# Patient Record
Sex: FEMALE | Race: WHITE | NOT HISPANIC OR LATINO | Employment: FULL TIME | URBAN - METROPOLITAN AREA
[De-identification: names, ages, dates, MRNs, and addresses within clinical notes are randomized per-mention and may not be internally consistent; named-entity substitution may affect disease eponyms.]

---

## 2017-04-25 ENCOUNTER — ANESTHESIA (OUTPATIENT)
Dept: SURGERY | Facility: CLINIC | Age: 37
End: 2017-04-25
Payer: COMMERCIAL

## 2017-04-25 ENCOUNTER — HOSPITAL ENCOUNTER (OUTPATIENT)
Facility: CLINIC | Age: 37
Discharge: HOME | End: 2017-04-25
Attending: OBSTETRICS & GYNECOLOGY | Admitting: OBSTETRICS & GYNECOLOGY
Payer: COMMERCIAL

## 2017-04-25 ENCOUNTER — SURGERY (OUTPATIENT)
Age: 37
End: 2017-04-25

## 2017-04-25 ENCOUNTER — ANESTHESIA EVENT (OUTPATIENT)
Dept: SURGERY | Facility: CLINIC | Age: 37
End: 2017-04-25
Payer: COMMERCIAL

## 2017-04-25 VITALS
HEIGHT: 64 IN | WEIGHT: 291 LBS | BODY MASS INDEX: 49.68 KG/M2 | TEMPERATURE: 98.1 F | OXYGEN SATURATION: 95 % | DIASTOLIC BLOOD PRESSURE: 82 MMHG | SYSTOLIC BLOOD PRESSURE: 137 MMHG | RESPIRATION RATE: 18 BRPM

## 2017-04-25 DIAGNOSIS — Z98.890 STATUS POST HYSTEROSCOPY: Primary | ICD-10-CM

## 2017-04-25 LAB
HCG SERPL QL: NEGATIVE
INTERPRETATION ECG - MUSE: NORMAL

## 2017-04-25 PROCEDURE — 36000056 ZZH SURGERY LEVEL 3 1ST 30 MIN: Performed by: OBSTETRICS & GYNECOLOGY

## 2017-04-25 PROCEDURE — 71000027 ZZH RECOVERY PHASE 2 EACH 15 MINS: Performed by: OBSTETRICS & GYNECOLOGY

## 2017-04-25 PROCEDURE — 71000012 ZZH RECOVERY PHASE 1 LEVEL 1 FIRST HR: Performed by: OBSTETRICS & GYNECOLOGY

## 2017-04-25 PROCEDURE — 36415 COLL VENOUS BLD VENIPUNCTURE: CPT | Performed by: ANESTHESIOLOGY

## 2017-04-25 PROCEDURE — 84703 CHORIONIC GONADOTROPIN ASSAY: CPT | Performed by: ANESTHESIOLOGY

## 2017-04-25 PROCEDURE — 27210794 ZZH OR GENERAL SUPPLY STERILE: Performed by: OBSTETRICS & GYNECOLOGY

## 2017-04-25 PROCEDURE — 25000128 H RX IP 250 OP 636: Performed by: NURSE ANESTHETIST, CERTIFIED REGISTERED

## 2017-04-25 PROCEDURE — 37000009 ZZH ANESTHESIA TECHNICAL FEE, EACH ADDTL 15 MIN: Performed by: OBSTETRICS & GYNECOLOGY

## 2017-04-25 PROCEDURE — 88305 TISSUE EXAM BY PATHOLOGIST: CPT | Performed by: OBSTETRICS & GYNECOLOGY

## 2017-04-25 PROCEDURE — 40000306 ZZH STATISTIC PRE PROC ASSESS II: Performed by: OBSTETRICS & GYNECOLOGY

## 2017-04-25 PROCEDURE — 25000125 ZZHC RX 250: Performed by: NURSE ANESTHETIST, CERTIFIED REGISTERED

## 2017-04-25 PROCEDURE — 25800025 ZZH RX 258: Performed by: ANESTHESIOLOGY

## 2017-04-25 PROCEDURE — 25000566 ZZH SEVOFLURANE, EA 15 MIN: Performed by: OBSTETRICS & GYNECOLOGY

## 2017-04-25 PROCEDURE — 88305 TISSUE EXAM BY PATHOLOGIST: CPT | Mod: 26 | Performed by: OBSTETRICS & GYNECOLOGY

## 2017-04-25 PROCEDURE — 36000058 ZZH SURGERY LEVEL 3 EA 15 ADDTL MIN: Performed by: OBSTETRICS & GYNECOLOGY

## 2017-04-25 PROCEDURE — 93010 ELECTROCARDIOGRAM REPORT: CPT | Performed by: INTERNAL MEDICINE

## 2017-04-25 PROCEDURE — 37000008 ZZH ANESTHESIA TECHNICAL FEE, 1ST 30 MIN: Performed by: OBSTETRICS & GYNECOLOGY

## 2017-04-25 RX ORDER — KETOROLAC TROMETHAMINE 30 MG/ML
INJECTION, SOLUTION INTRAMUSCULAR; INTRAVENOUS PRN
Status: DISCONTINUED | OUTPATIENT
Start: 2017-04-25 | End: 2017-04-25

## 2017-04-25 RX ORDER — KETOROLAC TROMETHAMINE 30 MG/ML
30 INJECTION, SOLUTION INTRAMUSCULAR; INTRAVENOUS EVERY 6 HOURS PRN
Status: DISCONTINUED | OUTPATIENT
Start: 2017-04-25 | End: 2017-04-25 | Stop reason: HOSPADM

## 2017-04-25 RX ORDER — SODIUM CHLORIDE, SODIUM LACTATE, POTASSIUM CHLORIDE, CALCIUM CHLORIDE 600; 310; 30; 20 MG/100ML; MG/100ML; MG/100ML; MG/100ML
INJECTION, SOLUTION INTRAVENOUS CONTINUOUS
Status: DISCONTINUED | OUTPATIENT
Start: 2017-04-25 | End: 2017-04-25 | Stop reason: HOSPADM

## 2017-04-25 RX ORDER — DROPERIDOL 2.5 MG/ML
0.62 INJECTION, SOLUTION INTRAMUSCULAR; INTRAVENOUS
Status: DISCONTINUED | OUTPATIENT
Start: 2017-04-25 | End: 2017-04-25

## 2017-04-25 RX ORDER — NORETHINDRONE ACETATE AND ETHINYL ESTRADIOL 1.5-30(21)
1 KIT ORAL DAILY
Status: ON HOLD | COMMUNITY
End: 2017-04-25

## 2017-04-25 RX ORDER — FENTANYL CITRATE 50 UG/ML
25-50 INJECTION, SOLUTION INTRAMUSCULAR; INTRAVENOUS
Status: DISCONTINUED | OUTPATIENT
Start: 2017-04-25 | End: 2017-04-25 | Stop reason: HOSPADM

## 2017-04-25 RX ORDER — ONDANSETRON 2 MG/ML
4 INJECTION INTRAMUSCULAR; INTRAVENOUS EVERY 30 MIN PRN
Status: DISCONTINUED | OUTPATIENT
Start: 2017-04-25 | End: 2017-04-25 | Stop reason: HOSPADM

## 2017-04-25 RX ORDER — ONDANSETRON 2 MG/ML
INJECTION INTRAMUSCULAR; INTRAVENOUS PRN
Status: DISCONTINUED | OUTPATIENT
Start: 2017-04-25 | End: 2017-04-25

## 2017-04-25 RX ORDER — DIMENHYDRINATE 50 MG/ML
25 INJECTION, SOLUTION INTRAMUSCULAR; INTRAVENOUS
Status: DISCONTINUED | OUTPATIENT
Start: 2017-04-25 | End: 2017-04-25 | Stop reason: HOSPADM

## 2017-04-25 RX ORDER — ACETAMINOPHEN 650 MG/1
650 SUPPOSITORY RECTAL EVERY 4 HOURS PRN
Status: DISCONTINUED | OUTPATIENT
Start: 2017-04-25 | End: 2017-04-25 | Stop reason: HOSPADM

## 2017-04-25 RX ORDER — METOCLOPRAMIDE HYDROCHLORIDE 5 MG/ML
10 INJECTION INTRAMUSCULAR; INTRAVENOUS EVERY 6 HOURS PRN
Status: DISCONTINUED | OUTPATIENT
Start: 2017-04-25 | End: 2017-04-25 | Stop reason: HOSPADM

## 2017-04-25 RX ORDER — LIDOCAINE 40 MG/G
CREAM TOPICAL
Status: DISCONTINUED | OUTPATIENT
Start: 2017-04-25 | End: 2017-04-25 | Stop reason: HOSPADM

## 2017-04-25 RX ORDER — DEXAMETHASONE SODIUM PHOSPHATE 4 MG/ML
4 INJECTION, SOLUTION INTRA-ARTICULAR; INTRALESIONAL; INTRAMUSCULAR; INTRAVENOUS; SOFT TISSUE EVERY 10 MIN PRN
Status: DISCONTINUED | OUTPATIENT
Start: 2017-04-25 | End: 2017-04-25 | Stop reason: HOSPADM

## 2017-04-25 RX ORDER — NALOXONE HYDROCHLORIDE 0.4 MG/ML
.1-.4 INJECTION, SOLUTION INTRAMUSCULAR; INTRAVENOUS; SUBCUTANEOUS
Status: DISCONTINUED | OUTPATIENT
Start: 2017-04-25 | End: 2017-04-25 | Stop reason: HOSPADM

## 2017-04-25 RX ORDER — ONDANSETRON 4 MG/1
4 TABLET, ORALLY DISINTEGRATING ORAL EVERY 30 MIN PRN
Status: DISCONTINUED | OUTPATIENT
Start: 2017-04-25 | End: 2017-04-25 | Stop reason: HOSPADM

## 2017-04-25 RX ORDER — PROPOFOL 10 MG/ML
INJECTION, EMULSION INTRAVENOUS PRN
Status: DISCONTINUED | OUTPATIENT
Start: 2017-04-25 | End: 2017-04-25

## 2017-04-25 RX ORDER — PROMETHAZINE HYDROCHLORIDE 25 MG/ML
12.5 INJECTION, SOLUTION INTRAMUSCULAR; INTRAVENOUS
Status: DISCONTINUED | OUTPATIENT
Start: 2017-04-25 | End: 2017-04-25 | Stop reason: HOSPADM

## 2017-04-25 RX ORDER — GLYCOPYRROLATE 0.2 MG/ML
INJECTION, SOLUTION INTRAMUSCULAR; INTRAVENOUS PRN
Status: DISCONTINUED | OUTPATIENT
Start: 2017-04-25 | End: 2017-04-25

## 2017-04-25 RX ORDER — OXYCODONE HYDROCHLORIDE 5 MG/1
5-10 TABLET ORAL
Qty: 10 TABLET | Refills: 0 | Status: SHIPPED | OUTPATIENT
Start: 2017-04-25 | End: 2022-05-09

## 2017-04-25 RX ORDER — MEPERIDINE HYDROCHLORIDE 25 MG/ML
12.5 INJECTION INTRAMUSCULAR; INTRAVENOUS; SUBCUTANEOUS
Status: DISCONTINUED | OUTPATIENT
Start: 2017-04-25 | End: 2017-04-25 | Stop reason: HOSPADM

## 2017-04-25 RX ORDER — HYDROMORPHONE HYDROCHLORIDE 1 MG/ML
.3-.5 INJECTION, SOLUTION INTRAMUSCULAR; INTRAVENOUS; SUBCUTANEOUS EVERY 10 MIN PRN
Status: DISCONTINUED | OUTPATIENT
Start: 2017-04-25 | End: 2017-04-25 | Stop reason: HOSPADM

## 2017-04-25 RX ORDER — DEXAMETHASONE SODIUM PHOSPHATE 4 MG/ML
INJECTION, SOLUTION INTRA-ARTICULAR; INTRALESIONAL; INTRAMUSCULAR; INTRAVENOUS; SOFT TISSUE PRN
Status: DISCONTINUED | OUTPATIENT
Start: 2017-04-25 | End: 2017-04-25

## 2017-04-25 RX ORDER — LIDOCAINE HYDROCHLORIDE 10 MG/ML
INJECTION, SOLUTION INFILTRATION; PERINEURAL PRN
Status: DISCONTINUED | OUTPATIENT
Start: 2017-04-25 | End: 2017-04-25

## 2017-04-25 RX ORDER — LABETALOL HYDROCHLORIDE 5 MG/ML
10 INJECTION, SOLUTION INTRAVENOUS
Status: DISCONTINUED | OUTPATIENT
Start: 2017-04-25 | End: 2017-04-25 | Stop reason: HOSPADM

## 2017-04-25 RX ORDER — PROPOFOL 10 MG/ML
INJECTION, EMULSION INTRAVENOUS CONTINUOUS PRN
Status: DISCONTINUED | OUTPATIENT
Start: 2017-04-25 | End: 2017-04-25

## 2017-04-25 RX ORDER — FENTANYL CITRATE 50 UG/ML
INJECTION, SOLUTION INTRAMUSCULAR; INTRAVENOUS PRN
Status: DISCONTINUED | OUTPATIENT
Start: 2017-04-25 | End: 2017-04-25

## 2017-04-25 RX ORDER — METOCLOPRAMIDE 10 MG/1
10 TABLET ORAL EVERY 6 HOURS PRN
Status: DISCONTINUED | OUTPATIENT
Start: 2017-04-25 | End: 2017-04-25 | Stop reason: HOSPADM

## 2017-04-25 RX ADMIN — MIDAZOLAM HYDROCHLORIDE 2 MG: 1 INJECTION, SOLUTION INTRAMUSCULAR; INTRAVENOUS at 10:24

## 2017-04-25 RX ADMIN — DEXAMETHASONE SODIUM PHOSPHATE 4 MG: 4 INJECTION, SOLUTION INTRA-ARTICULAR; INTRALESIONAL; INTRAMUSCULAR; INTRAVENOUS; SOFT TISSUE at 10:35

## 2017-04-25 RX ADMIN — KETOROLAC TROMETHAMINE 30 MG: 30 INJECTION, SOLUTION INTRAMUSCULAR at 11:10

## 2017-04-25 RX ADMIN — GLYCOPYRROLATE 0.1 MG: 0.2 INJECTION, SOLUTION INTRAMUSCULAR; INTRAVENOUS at 10:35

## 2017-04-25 RX ADMIN — GLYCOPYRROLATE 0.3 MG: 0.2 INJECTION, SOLUTION INTRAMUSCULAR; INTRAVENOUS at 10:29

## 2017-04-25 RX ADMIN — FENTANYL CITRATE 25 MCG: 50 INJECTION, SOLUTION INTRAMUSCULAR; INTRAVENOUS at 10:35

## 2017-04-25 RX ADMIN — SODIUM CHLORIDE, POTASSIUM CHLORIDE, SODIUM LACTATE AND CALCIUM CHLORIDE: 600; 310; 30; 20 INJECTION, SOLUTION INTRAVENOUS at 10:24

## 2017-04-25 RX ADMIN — PROPOFOL 75 MCG/KG/MIN: 10 INJECTION, EMULSION INTRAVENOUS at 10:40

## 2017-04-25 RX ADMIN — ONDANSETRON 4 MG: 2 INJECTION INTRAMUSCULAR; INTRAVENOUS at 10:31

## 2017-04-25 RX ADMIN — LIDOCAINE HYDROCHLORIDE 50 MG: 10 INJECTION, SOLUTION INFILTRATION; PERINEURAL at 10:35

## 2017-04-25 RX ADMIN — FENTANYL CITRATE 75 MCG: 50 INJECTION, SOLUTION INTRAMUSCULAR; INTRAVENOUS at 10:29

## 2017-04-25 RX ADMIN — PROPOFOL 200 MG: 10 INJECTION, EMULSION INTRAVENOUS at 10:35

## 2017-04-25 NOTE — DISCHARGE INSTRUCTIONS
DILATION AND CURETTAGE DISCHARGE INSTRUCTIONS    PLEASE RETURN TO THE CLINIC IN:  1-2 WEEKS  MAKE THIS APPOINTMENT AFTER YOU GET HOME IF IT HAS NOT ALREADY BEEN SCHEDULED.    DO NOT DRIVE A CAR, DRINK ALCOHOL OR USE MACHINERY FOR THE NEXT 24 HOURS.  YOU SHOULD WAIT UNTIL YOU HAVE RECOVERED BEFORE MAKING ANY IMPORTANT DECISIONS.    PAIN AND DISCOMFORT  YOU MAY HAVE CRAMPS OR A LOW BACKACHE FOR 24 TO 48 HOURS.  TYLENOL (ACETAMINOPHEN) OR MOTRIN (IBUPROFEN) MAY HELP, OR YOUR DOCTOR MAY GIVE YOU PAIN MEDICINE.  CALL YOUR DOCTOR IF PAIN CANNOT BE CONTROLLED.  YOU MAY FEEL DROWSY AND WEAK FOR A DAY OR TWO.    VAGINAL DISCHARGE  YOU MAY HAVE SOME BLEEDING OR DISCHARGE FOR UP TO TWO WEEKS.  DO NOT DOUCHE, USE TAMPONS OR HAVE SEX (INTERCOURSE) IN THE FIRST WEEK.  CALL YOUR DOCTOR IF YOU SOAK MORE THAN ONE MAXI PAD (SANITARY NAPKIN) PER HOUR, OR IF YOU PASS LARGE BLOOD CLOTS.    OTHER SYMPTOMS  YOU MAY HAVE A LOW FEVER FOR THE FIRST TWO DAYS.  CALL YOUR DOCTOR IF YOUR FEVER GOES OVER 101 DEGREES FAHRENHEIT.    IF YOU HAVE NAUSEA (FEEL SICK TO YOUR STOMACH), STAY IN BED.  TRY DRINKING A SMALL AMOUNT 7-UP, TEA OR SOUP.    DIET AND ACTIVITY  EAT LIGHT MEALS AND DRINK PLENTY OF FLUIDS FOR THE FIRST 24 HOURS (OR LONGER, IF YOU HAVE NAUSEA).    YOU MAY BATHE, SHOWER AND CLIMB STAIRS.  MOST WOMEN CAN RETURN TO WORK AFTER 24 HOURS.  YOU MAY GO BACK TO YOUR OTHER ACTIVITIES AFTER YOUR PAIN GOES AWAY.        GENERAL ANESTHESIA OR SEDATION ADULT DISCHARGE INSTRUCTIONS   SPECIAL PRECAUTIONS FOR 24 HOURS AFTER SURGERY    IT IS NOT UNUSUAL TO FEEL LIGHT-HEADED OR FAINT, UP TO 24 HOURS AFTER SURGERY OR WHILE TAKING PAIN MEDICATION.  IF YOU HAVE THESE SYMPTOMS; SIT FOR A FEW MINUTES BEFORE STANDING AND HAVE SOMEONE ASSIST YOU WHEN YOU GET UP TO WALK OR USE THE BATHROOM.    YOU SHOULD REST AND RELAX FOR THE NEXT 24 HOURS AND YOU MUST MAKE ARRANGEMENTS TO HAVE SOMEONE STAY WITH YOU FOR AT LEAST 24 HOURS AFTER YOUR DISCHARGE.  AVOID HAZARDOUS  AND STRENUOUS ACTIVITIES.  DO NOT MAKE IMPORTANT DECISIONS FOR 24 HOURS.    DO NOT DRIVE ANY VEHICLE OR OPERATE MECHANICAL EQUIPMENT FOR 24 HOURS FOLLOWING THE END OF YOUR SURGERY.  EVEN THOUGH YOU MAY FEEL NORMAL, YOUR REACTIONS MAY BE AFFECTED BY THE MEDICATION YOU HAVE RECEIVED.    DO NOT DRINK ALCOHOLIC BEVERAGES FOR 24 HOURS FOLLOWING YOUR SURGERY.    DRINK CLEAR LIQUIDS (APPLE JUICE, GINGER ALE, 7-UP, BROTH, ETC.).  PROGRESS TO YOUR REGULAR DIET AS YOU FEEL ABLE.    YOU MAY HAVE A DRY MOUTH, A SORE THROAT, MUSCLES ACHES OR TROUBLE SLEEPING.  THESE SHOULD GO AWAY AFTER 24 HOURS.    CALL YOUR DOCTOR FOR ANY OF THE FOLLOWING:  SIGNS OF INFECTION (FEVER, GROWING TENDERNESS AT THE SURGERY SITE, A LARGE AMOUNT OF DRAINAGE OR BLEEDING, SEVERE PAIN, FOUL-SMELLING DRAINAGE, REDNESS OR SWELLING.    IT HAS BEEN OVER 8 TO 10 HOURS SINCE SURGERY AND YOU ARE STILL NOT ABLE TO URINATE (PASS WATER).     DR. FLACO DANIELS M.D.   CLINIC PHONE NUMBER:  [phone].    You received Toradol, an IV form of ibuprofen (Motrin) at 11:10 am.  Do not take any ibuprofen products until 5:10 pm.

## 2017-04-25 NOTE — IP AVS SNAPSHOT
[facility] PreOP/PostOP    [address]    [facility] [address]    Phone:  [phone]    Fax:  [phone]                                       After Visit Summary   4/25/2017    Haylee Couch    MRN: [id number]           After Visit Summary Signature Page     I have received my discharge instructions, and my questions have been answered. I have discussed any challenges I see with this plan with the nurse or doctor.    ..........................................................................................................................................  Patient/Patient Representative Signature      ..........................................................................................................................................  Patient Representative Print Name and Relationship to Patient    ..................................................               ................................................  Date                                            Time    ..........................................................................................................................................  Reviewed by Signature/Title    ...................................................              ..............................................  Date                                                            Time

## 2017-04-25 NOTE — ANESTHESIA CARE TRANSFER NOTE
Patient: Haylee Couch    Procedure(s):  DILATION AND CURETTAGE, HYSTEROSCOPY, Polypectomy WITH MORCELLATOR - Wound Class: II-Clean Contaminated    Diagnosis: Thickened Endometrium, POlyp   Diagnosis Additional Information: No value filed.    Anesthesia Type:   General, LMA     Note:  Airway :Face Mask  Patient transferred to:PACU  Comments: vss      Vitals: (Last set prior to Anesthesia Care Transfer)    CRNA VITALS  4/25/2017 1045 - 4/25/2017 1119      4/25/2017             Pulse: 107    SpO2: 99 %    Resp Rate (observed): 20                Electronically Signed By: AIMEE Carver CRNA  April 25, 2017  11:19 AM

## 2017-04-25 NOTE — BRIEF OP NOTE
[facility] Brief Operative Note    Pre-operative diagnosis: Thickened Endometrium, POlyp    Post-operative diagnosis menometrorrhagia  No discrete polyp seen   Procedure: Procedure(s):  DILATION AND CURETTAGE, HYSTEROSCOPY, Polypectomy WITH MORCELLATOR - Wound Class: II-Clean Contaminated   Surgeon(s): Surgeon(s) and Role:     * Galileo Rm MD - Primary   Estimated blood loss: 25 mL    Specimens:   ID Type Source Tests Collected by Time Destination   A : Endometrial Curettings Polyp Endometrium SURGICAL PATHOLOGY EXAM Galileo Rm MD 4/25/2017 11:07 AM       Findings: Fluffy endometrium, no discrete polyp

## 2017-04-25 NOTE — ANESTHESIA PREPROCEDURE EVALUATION
Anesthesia Evaluation     . Pt has had prior anesthetic. Type: General and Regional           ROS/MED HX    ENT/Pulmonary:  - neg pulmonary ROS     Neurologic:  - neg neurologic ROS     Cardiovascular:  - neg cardiovascular ROS       METS/Exercise Tolerance:     Hematologic:  - neg hematologic  ROS       Musculoskeletal:  - neg musculoskeletal ROS       GI/Hepatic:  - neg GI/hepatic ROS       Renal/Genitourinary:  - ROS Renal section negative       Endo:     (+) Obesity, Other Endocrine Disorder menorrhagia.      Psychiatric:  - neg psychiatric ROS       Infectious Disease:  - neg infectious disease ROS       Malignancy:      - no malignancy   Other:    (+) No chance of pregnancy C-spine cleared: N/A, no H/O Chronic Pain,no other significant disability                    Physical Exam  Normal systems: cardiovascular, pulmonary and dental    Airway   Mallampati: II  TM distance: >3 FB  Neck ROM: full    Dental     Cardiovascular       Pulmonary                     Anesthesia Plan      History & Physical Review  History and physical reviewed and following examination; no interval change.    ASA Status:  1 .    NPO Status:  > 8 hours    Plan for General and LMA with Intravenous induction. Maintenance will be Balanced.    PONV prophylaxis:  Ondansetron (or other 5HT-3) and Dexamethasone or Solumedrol       Postoperative Care  Postoperative pain management:  IV analgesics.      Consents  Anesthetic plan, risks, benefits and alternatives discussed with:  Patient.  Use of blood products discussed: Yes.   Use of blood products discussed with Patient.  Consented to blood products.  .                          .

## 2017-04-25 NOTE — OP NOTE
DATE OF PROCEDURE:  04/25/2017      PREOPERATIVE DIAGNOSIS:  Menometrorrhagia, thickened endometrium with possible endometrial polyp.      POSTOPERATIVE DIAGNOSIS:  Menometrorrhagia, thickened endometrium with possible endometrial polyp.  No discrete polyp identified.      PROCEDURE:  Hysteroscopy, D&C using MyoSure morcellator.      SURGEON:  Galileo Rm MD      ANESTHESIA:  General.      INDICATIONS:  Haylee Couch is a 36-year-old  female, para 2, who was recently evaluated for menometrorrhagia as an outpatient with both ultrasound and endometrial biopsy.  The endometrial biopsy demonstrated tissue insufficient for diagnosis.  The ultrasound showed a markedly thickened endometrium with no discrete polyp identified, but I was somewhat suspicious of this given the absence of any endometrial tissue on the biopsy.  Risks, benefits and alternatives of further management were discussed with the patient, she was not responding to hormonal therapy, so surgical evaluation with hysteroscopy, D & C and possible polypectomy was advised and consent obtained.      OPERATIVE FINDINGS:  Examination under anesthesia demonstrated midline mobile uterus.  At the time of hysteroscopy, the uterine cavity did not contain any discrete polyp.  There was a generalized fluffy endometrium throughout.  Both tubal ostia were identified and the overall configuration of the endometrial cavity appeared quite normal.      OPERATIVE PROCEDURE:  After adequate general anesthesia was obtained, the patient was placed in dorsal lithotomy position, prepped and draped in the usual sterile fashion.  The bladder was sterilely catheterized for 25 mL of clear urine.  A speculum was inserted in the vagina and the cervix was visualized.  The anterior lip of the cervix was grasped with a single-tooth tenaculum.  The uterus was sounded.  A series of dilators up to 7 mm were passed sequentially through the endocervix.  An operative hysteroscope was  then advanced to the uterine fundus and using saline as a distention medium the uterine cavity was visualized.  A photographic record was made.  I then used the MyoSure morcellator to sample and remove the vast majority of the fluffy endometrium contained within the uterine cavity and into the lower uterine segment.  The hysteroscope was then removed, a sharp curet advanced into the endometrial cavity and the endometrial cavity circumferentially curetted with minimal further tissue returned.  The hysteroscope was then reinserted and some additional loose fragments of tissue were collected and morcellated.  The hysteroscope was then removed.  The tenaculum removed from the anterior lip of the cervix.  Pressure applied with a sponge stick briefly until hemostasis of the tenaculum insertion site was established.  The speculum was then removed from the vagina, thus completing the procedure.  There were no intraoperative complications.  Estimated blood loss was 50 mL.         FLACO DANIELS MD             D: 2017 11:35   T: 2017 13:56   MT: EM#[id number]      Name:     EDSON ACE   MRN:      [id number]        Account:        [id number]   :      1980           Procedure Date: 2017      Document: [id number]       cc: Flaco Daniels MD

## 2017-04-25 NOTE — ANESTHESIA POSTPROCEDURE EVALUATION
Patient: Haylee Couch    Procedure(s):  DILATION AND CURETTAGE, HYSTEROSCOPY, Polypectomy WITH MORCELLATOR - Wound Class: II-Clean Contaminated    Diagnosis:Thickened Endometrium, POlyp   Diagnosis Additional Information: Thickened Endometrium, POlyp     Anesthesia Type:  General, LMA    Note:  Anesthesia Post Evaluation    Patient location during evaluation: PACU  Patient participation: Able to fully participate in evaluation  Level of consciousness: awake and alert  Pain management: adequate  Airway patency: patent  Cardiovascular status: acceptable  Respiratory status: acceptable  Hydration status: acceptable  PONV: controlled     Anesthetic complications: None          Last vitals:  Vitals:    04/25/17 1210 04/25/17 1236 04/25/17 1300   BP: 147/86 138/78 137/82   Resp: 18 19 18   Temp:  98.1  F (36.7  C)    SpO2: 97% 97% 95%         Electronically Signed By: Cricket John MD  April 25, 2017  1:52 PM

## 2017-04-25 NOTE — IP AVS SNAPSHOT
MRN:[id number]                      After Visit Summary   4/25/2017    Haylee Couch    MRN: [id number]           Thank you!     Thank you for choosing [facility] for your care. Our goal is always to provide you with excellent care. Hearing back from our patients is one way we can continue to improve our services. Please take a few minutes to complete the written survey that you may receive in the mail after you visit. If you would like to speak to someone directly about your visit please contact Patient Relations at [phone]. Thank you!          Patient Information     Date Of Birth          1980        About your hospital stay     You were admitted on:  April 25, 2017 You last received care in the:  [facility] PreOP/PostOP    You were discharged on:  April 25, 2017       Who to Call     For medical emergencies, please call 911.  For non-urgent questions about your medical care, please call your primary care provider or clinic, [phone]  For questions related to your surgery, please call your surgery clinic        Attending Provider     Provider Specialty    Galileo Rm MD OB/Gyn       Primary Care Provider Office Phone # Fax #    [facility] [phone] [address] [place] [address]        After Care Instructions     Discharge Instructions       Resume pre procedure diet            Discharge Instructions       Pelvic Rest. No tampons, douching or intercourse for  2  weeks.            No alcohol       NO ALCOHOL for 24 hours post procedure            No driving or operating machinery       No driving or operating machinery until day after procedure                  Further instructions from your care team       DILATION AND CURETTAGE DISCHARGE INSTRUCTIONS    PLEASE RETURN TO THE CLINIC IN:  1-2 WEEKS  MAKE THIS APPOINTMENT AFTER YOU GET HOME IF IT HAS NOT ALREADY BEEN SCHEDULED.    DO NOT DRIVE A CAR, DRINK  ALCOHOL OR USE MACHINERY FOR THE NEXT 24 HOURS.  YOU SHOULD WAIT UNTIL YOU HAVE RECOVERED BEFORE MAKING ANY IMPORTANT DECISIONS.    PAIN AND DISCOMFORT  YOU MAY HAVE CRAMPS OR A LOW BACKACHE FOR 24 TO 48 HOURS.  TYLENOL (ACETAMINOPHEN) OR MOTRIN (IBUPROFEN) MAY HELP, OR YOUR DOCTOR MAY GIVE YOU PAIN MEDICINE.  CALL YOUR DOCTOR IF PAIN CANNOT BE CONTROLLED.  YOU MAY FEEL DROWSY AND WEAK FOR A DAY OR TWO.    VAGINAL DISCHARGE  YOU MAY HAVE SOME BLEEDING OR DISCHARGE FOR UP TO TWO WEEKS.  DO NOT DOUCHE, USE TAMPONS OR HAVE SEX (INTERCOURSE) IN THE FIRST WEEK.  CALL YOUR DOCTOR IF YOU SOAK MORE THAN ONE MAXI PAD (SANITARY NAPKIN) PER HOUR, OR IF YOU PASS LARGE BLOOD CLOTS.    OTHER SYMPTOMS  YOU MAY HAVE A LOW FEVER FOR THE FIRST TWO DAYS.  CALL YOUR DOCTOR IF YOUR FEVER GOES OVER 101 DEGREES FAHRENHEIT.    IF YOU HAVE NAUSEA (FEEL SICK TO YOUR STOMACH), STAY IN BED.  TRY DRINKING A SMALL AMOUNT 7-UP, TEA OR SOUP.    DIET AND ACTIVITY  EAT LIGHT MEALS AND DRINK PLENTY OF FLUIDS FOR THE FIRST 24 HOURS (OR LONGER, IF YOU HAVE NAUSEA).    YOU MAY BATHE, SHOWER AND CLIMB STAIRS.  MOST WOMEN CAN RETURN TO WORK AFTER 24 HOURS.  YOU MAY GO BACK TO YOUR OTHER ACTIVITIES AFTER YOUR PAIN GOES AWAY.        GENERAL ANESTHESIA OR SEDATION ADULT DISCHARGE INSTRUCTIONS   SPECIAL PRECAUTIONS FOR 24 HOURS AFTER SURGERY    IT IS NOT UNUSUAL TO FEEL LIGHT-HEADED OR FAINT, UP TO 24 HOURS AFTER SURGERY OR WHILE TAKING PAIN MEDICATION.  IF YOU HAVE THESE SYMPTOMS; SIT FOR A FEW MINUTES BEFORE STANDING AND HAVE SOMEONE ASSIST YOU WHEN YOU GET UP TO WALK OR USE THE BATHROOM.    YOU SHOULD REST AND RELAX FOR THE NEXT 24 HOURS AND YOU MUST MAKE ARRANGEMENTS TO HAVE SOMEONE STAY WITH YOU FOR AT LEAST 24 HOURS AFTER YOUR DISCHARGE.  AVOID HAZARDOUS AND STRENUOUS ACTIVITIES.  DO NOT MAKE IMPORTANT DECISIONS FOR 24 HOURS.    DO NOT DRIVE ANY VEHICLE OR OPERATE MECHANICAL EQUIPMENT FOR 24 HOURS FOLLOWING THE END OF YOUR SURGERY.  EVEN THOUGH YOU MAY  "FEEL NORMAL, YOUR REACTIONS MAY BE AFFECTED BY THE MEDICATION YOU HAVE RECEIVED.    DO NOT DRINK ALCOHOLIC BEVERAGES FOR 24 HOURS FOLLOWING YOUR SURGERY.    DRINK CLEAR LIQUIDS (APPLE JUICE, GINGER ALE, 7-UP, BROTH, ETC.).  PROGRESS TO YOUR REGULAR DIET AS YOU FEEL ABLE.    YOU MAY HAVE A DRY MOUTH, A SORE THROAT, MUSCLES ACHES OR TROUBLE SLEEPING.  THESE SHOULD GO AWAY AFTER 24 HOURS.    CALL YOUR DOCTOR FOR ANY OF THE FOLLOWING:  SIGNS OF INFECTION (FEVER, GROWING TENDERNESS AT THE SURGERY SITE, A LARGE AMOUNT OF DRAINAGE OR BLEEDING, SEVERE PAIN, FOUL-SMELLING DRAINAGE, REDNESS OR SWELLING.    IT HAS BEEN OVER 8 TO 10 HOURS SINCE SURGERY AND YOU ARE STILL NOT ABLE TO URINATE (PASS WATER).     DR. FLACO DANIELS M.D.   CLINIC PHONE NUMBER:  [phone].    You received Toradol, an IV form of ibuprofen (Motrin) at 11:10 am.  Do not take any ibuprofen products until 5:10 pm.    Pending Results     Date and Time Order Name Status Description    4/25/2017 1108 Surgical pathology exam In process     4/25/2017 0914 EKG 12-lead, tracing only Preliminary             Admission Information     Date & Time Provider Department Dept. Phone    4/25/2017 Flaco Daniels MD [facility] PreOP/PostOP [phone]      Your Vitals Were     Blood Pressure Temperature Respirations Height Weight Last Period    147/86 97.4  F (36.3  C) (Temporal) 18 1.626 m (5' 4\") 132 kg (291 lb) 03/08/2017    Pulse Oximetry BMI (Body Mass Index)                97% 49.95 kg/m2          GiveLoop Information     GiveLoop lets you send messages to your doctor, view your test results, renew your prescriptions, schedule appointments and more. To sign up, go to [email or web address] . Click on \"Log in\" on the left side of the screen, which will take you to the Welcome page. Then click on \"Sign up Now\" on the right side of the page.     You will be asked to enter the access code listed below, as well as some personal information. Please " follow the directions to create your username and password.     Your access code is: [email or web address]  Expires: 2017 12:33 PM     Your access code will  in 90 days. If you need help or a new code, please call your [place] clinic or [phone].        Care EveryWhere ID     This is your Care EveryWhere ID. This could be used by other organizations to access your [place] medical records  [id number]           Review of your medicines      START taking        Dose / Directions    oxyCODONE 5 MG IR tablet   Commonly known as:  ROXICODONE   Used for:  Status post hysteroscopy        Dose:  5-10 mg   Take 1-2 tablets (5-10 mg) by mouth every 3 hours as needed for pain or other (Moderate to Severe)   Quantity:  10 tablet   Refills:  0         STOP taking     norethindrone-ethinyl estradiol-iron 1.5-30 MG-MCG per tablet   Commonly known as:  MICROGESTIN FE1.530                Where to get your medicines      Some of these will need a paper prescription and others can be bought over the counter. Ask your nurse if you have questions.     Bring a paper prescription for each of these medications     oxyCODONE 5 MG IR tablet                Protect others around you: Learn how to safely use, store and throw away your medicines at www.disposemymeds.org.             Medication List: This is a list of all your medications and when to take them. Check marks below indicate your daily home schedule. Keep this list as a reference.      Medications           Morning Afternoon Evening Bedtime As Needed    oxyCODONE 5 MG IR tablet   Commonly known as:  ROXICODONE   Take 1-2 tablets (5-10 mg) by mouth every 3 hours as needed for pain or other (Moderate to Severe)

## 2017-04-26 LAB — COPATH REPORT: NORMAL

## 2022-05-09 ENCOUNTER — HOSPITAL ENCOUNTER (OUTPATIENT)
Facility: CLINIC | Age: 42
Setting detail: OBSERVATION
Discharge: HOME | End: 2022-05-13
Attending: EMERGENCY MEDICINE | Admitting: INTERNAL MEDICINE
Payer: COMMERCIAL

## 2022-05-09 DIAGNOSIS — M54.16 LUMBAR BACK PAIN WITH RADICULOPATHY AFFECTING LEFT LOWER EXTREMITY: ICD-10-CM

## 2022-05-09 LAB
ANION GAP SERPL CALCULATED.3IONS-SCNC: 7 MMOL/L (ref 3–14)
BASOPHILS # BLD AUTO: 0.1 10E3/UL (ref 0–0.2)
BASOPHILS NFR BLD AUTO: 1 %
BUN SERPL-MCNC: 17 MG/DL (ref 7–30)
CALCIUM SERPL-MCNC: 8.8 MG/DL (ref 8.5–10.1)
CHLORIDE BLD-SCNC: 109 MMOL/L (ref 94–109)
CO2 SERPL-SCNC: 25 MMOL/L (ref 20–32)
CREAT SERPL-MCNC: 0.71 MG/DL (ref 0.52–1.04)
EOSINOPHIL # BLD AUTO: 0 10E3/UL (ref 0–0.7)
EOSINOPHIL NFR BLD AUTO: 0 %
ERYTHROCYTE [DISTWIDTH] IN BLOOD BY AUTOMATED COUNT: 12 % (ref 10–15)
GFR SERPL CREATININE-BSD FRML MDRD: >90 ML/MIN/1.73M2
GLUCOSE BLD-MCNC: 87 MG/DL (ref 70–99)
HCT VFR BLD AUTO: 41.9 % (ref 35–47)
HGB BLD-MCNC: 13.8 G/DL (ref 11.7–15.7)
IMM GRANULOCYTES # BLD: 0.1 10E3/UL
IMM GRANULOCYTES NFR BLD: 1 %
LYMPHOCYTES # BLD AUTO: 2.1 10E3/UL (ref 0.8–5.3)
LYMPHOCYTES NFR BLD AUTO: 21 %
MCH RBC QN AUTO: 29.1 PG (ref 26.5–33)
MCHC RBC AUTO-ENTMCNC: 32.9 G/DL (ref 31.5–36.5)
MCV RBC AUTO: 88 FL (ref 78–100)
MONOCYTES # BLD AUTO: 0.8 10E3/UL (ref 0–1.3)
MONOCYTES NFR BLD AUTO: 8 %
NEUTROPHILS # BLD AUTO: 7 10E3/UL (ref 1.6–8.3)
NEUTROPHILS NFR BLD AUTO: 69 %
NRBC # BLD AUTO: 0 10E3/UL
NRBC BLD AUTO-RTO: 0 /100
PLATELET # BLD AUTO: 219 10E3/UL (ref 150–450)
POTASSIUM BLD-SCNC: 3.4 MMOL/L (ref 3.4–5.3)
RBC # BLD AUTO: 4.75 10E6/UL (ref 3.8–5.2)
SARS-COV-2 RNA RESP QL NAA+PROBE: NEGATIVE
SODIUM SERPL-SCNC: 141 MMOL/L (ref 133–144)
WBC # BLD AUTO: 10 10E3/UL (ref 4–11)

## 2022-05-09 PROCEDURE — G0378 HOSPITAL OBSERVATION PER HR: HCPCS

## 2022-05-09 PROCEDURE — U0005 INFEC AGEN DETEC AMPLI PROBE: HCPCS | Performed by: EMERGENCY MEDICINE

## 2022-05-09 PROCEDURE — 96375 TX/PRO/DX INJ NEW DRUG ADDON: CPT

## 2022-05-09 PROCEDURE — 250N000013 HC RX MED GY IP 250 OP 250 PS 637: Performed by: INTERNAL MEDICINE

## 2022-05-09 PROCEDURE — 99285 EMERGENCY DEPT VISIT HI MDM: CPT | Mod: 25

## 2022-05-09 PROCEDURE — C9803 HOPD COVID-19 SPEC COLLECT: HCPCS

## 2022-05-09 PROCEDURE — 250N000011 HC RX IP 250 OP 636: Performed by: EMERGENCY MEDICINE

## 2022-05-09 PROCEDURE — 99219 PR INITIAL OBSERVATION CARE,LEVEL II: CPT | Performed by: INTERNAL MEDICINE

## 2022-05-09 PROCEDURE — 250N000013 HC RX MED GY IP 250 OP 250 PS 637: Performed by: EMERGENCY MEDICINE

## 2022-05-09 PROCEDURE — 96374 THER/PROPH/DIAG INJ IV PUSH: CPT

## 2022-05-09 PROCEDURE — 36415 COLL VENOUS BLD VENIPUNCTURE: CPT | Performed by: EMERGENCY MEDICINE

## 2022-05-09 PROCEDURE — 85025 COMPLETE CBC W/AUTO DIFF WBC: CPT | Performed by: EMERGENCY MEDICINE

## 2022-05-09 PROCEDURE — 80048 BASIC METABOLIC PNL TOTAL CA: CPT | Performed by: EMERGENCY MEDICINE

## 2022-05-09 RX ORDER — TRAMADOL HYDROCHLORIDE 50 MG/1
50-100 TABLET ORAL EVERY 8 HOURS PRN
Status: ON HOLD | COMMUNITY
End: 2022-06-10

## 2022-05-09 RX ORDER — NALOXONE HYDROCHLORIDE 0.4 MG/ML
0.4 INJECTION, SOLUTION INTRAMUSCULAR; INTRAVENOUS; SUBCUTANEOUS
Status: DISCONTINUED | OUTPATIENT
Start: 2022-05-09 | End: 2022-05-13 | Stop reason: HOSPADM

## 2022-05-09 RX ORDER — HYDROMORPHONE HYDROCHLORIDE 1 MG/ML
0.5 INJECTION, SOLUTION INTRAMUSCULAR; INTRAVENOUS; SUBCUTANEOUS
Status: DISCONTINUED | OUTPATIENT
Start: 2022-05-09 | End: 2022-05-11

## 2022-05-09 RX ORDER — KETOROLAC TROMETHAMINE 15 MG/ML
15 INJECTION, SOLUTION INTRAMUSCULAR; INTRAVENOUS ONCE
Status: COMPLETED | OUTPATIENT
Start: 2022-05-09 | End: 2022-05-09

## 2022-05-09 RX ORDER — ONDANSETRON 2 MG/ML
4 INJECTION INTRAMUSCULAR; INTRAVENOUS EVERY 6 HOURS PRN
Status: DISCONTINUED | OUTPATIENT
Start: 2022-05-09 | End: 2022-05-13 | Stop reason: HOSPADM

## 2022-05-09 RX ORDER — ONDANSETRON 4 MG/1
4 TABLET, ORALLY DISINTEGRATING ORAL EVERY 6 HOURS PRN
Status: DISCONTINUED | OUTPATIENT
Start: 2022-05-09 | End: 2022-05-13 | Stop reason: HOSPADM

## 2022-05-09 RX ORDER — AMOXICILLIN 250 MG
2 CAPSULE ORAL 2 TIMES DAILY PRN
Status: DISCONTINUED | OUTPATIENT
Start: 2022-05-09 | End: 2022-05-13 | Stop reason: HOSPADM

## 2022-05-09 RX ORDER — ACETAMINOPHEN 325 MG/1
975 TABLET ORAL EVERY 8 HOURS
Status: DISCONTINUED | OUTPATIENT
Start: 2022-05-09 | End: 2022-05-13 | Stop reason: HOSPADM

## 2022-05-09 RX ORDER — CYCLOBENZAPRINE HCL 5 MG
5 TABLET ORAL 3 TIMES DAILY PRN
Status: ON HOLD | COMMUNITY
End: 2022-05-13

## 2022-05-09 RX ORDER — KETOROLAC TROMETHAMINE 30 MG/ML
30 INJECTION, SOLUTION INTRAMUSCULAR; INTRAVENOUS EVERY 6 HOURS PRN
Status: DISPENSED | OUTPATIENT
Start: 2022-05-10 | End: 2022-05-11

## 2022-05-09 RX ORDER — IBUPROFEN 200 MG
400-800 TABLET ORAL EVERY 8 HOURS PRN
Status: ON HOLD | COMMUNITY
End: 2022-05-13

## 2022-05-09 RX ORDER — PREDNISONE 20 MG/1
20 TABLET ORAL 2 TIMES DAILY
Status: ON HOLD | COMMUNITY
End: 2022-05-13

## 2022-05-09 RX ORDER — HYDROCODONE BITARTRATE AND ACETAMINOPHEN 5; 325 MG/1; MG/1
2 TABLET ORAL ONCE
Status: COMPLETED | OUTPATIENT
Start: 2022-05-09 | End: 2022-05-09

## 2022-05-09 RX ORDER — LIDOCAINE 4 G/G
1 PATCH TOPICAL
Status: DISCONTINUED | OUTPATIENT
Start: 2022-05-09 | End: 2022-05-13 | Stop reason: HOSPADM

## 2022-05-09 RX ORDER — AMOXICILLIN 250 MG
1 CAPSULE ORAL 2 TIMES DAILY PRN
Status: DISCONTINUED | OUTPATIENT
Start: 2022-05-09 | End: 2022-05-13 | Stop reason: HOSPADM

## 2022-05-09 RX ORDER — LEVONORGESTREL AND ETHINYL ESTRADIOL 0.15-0.03
1 KIT ORAL DAILY
Status: ON HOLD | COMMUNITY
End: 2022-06-10

## 2022-05-09 RX ORDER — NALOXONE HYDROCHLORIDE 0.4 MG/ML
0.2 INJECTION, SOLUTION INTRAMUSCULAR; INTRAVENOUS; SUBCUTANEOUS
Status: DISCONTINUED | OUTPATIENT
Start: 2022-05-09 | End: 2022-05-13 | Stop reason: HOSPADM

## 2022-05-09 RX ORDER — GABAPENTIN 300 MG/1
300-600 CAPSULE ORAL AT BEDTIME
Status: ON HOLD | COMMUNITY
End: 2022-05-13

## 2022-05-09 RX ORDER — MULTIPLE VITAMINS W/ MINERALS TAB 9MG-400MCG
1 TAB ORAL DAILY
COMMUNITY

## 2022-05-09 RX ORDER — HYDROMORPHONE HYDROCHLORIDE 1 MG/ML
0.5 INJECTION, SOLUTION INTRAMUSCULAR; INTRAVENOUS; SUBCUTANEOUS EVERY 30 MIN PRN
Status: DISCONTINUED | OUTPATIENT
Start: 2022-05-09 | End: 2022-05-09

## 2022-05-09 RX ADMIN — ACETAMINOPHEN 975 MG: 325 TABLET ORAL at 23:01

## 2022-05-09 RX ADMIN — HYDROMORPHONE HYDROCHLORIDE 0.5 MG: 1 INJECTION, SOLUTION INTRAMUSCULAR; INTRAVENOUS; SUBCUTANEOUS at 22:41

## 2022-05-09 RX ADMIN — KETOROLAC TROMETHAMINE 15 MG: 15 INJECTION, SOLUTION INTRAMUSCULAR; INTRAVENOUS at 21:06

## 2022-05-09 RX ADMIN — HYDROCODONE BITARTRATE AND ACETAMINOPHEN 2 TABLET: 5; 325 TABLET ORAL at 16:45

## 2022-05-09 RX ADMIN — LIDOCAINE 1 PATCH: 560 PATCH PERCUTANEOUS; TOPICAL; TRANSDERMAL at 23:01

## 2022-05-09 ASSESSMENT — ENCOUNTER SYMPTOMS
SORE THROAT: 0
CHILLS: 0
NUMBNESS: 1
BACK PAIN: 1
FEVER: 0
MYALGIAS: 1
COUGH: 0

## 2022-05-10 ENCOUNTER — APPOINTMENT (OUTPATIENT)
Dept: MRI IMAGING | Facility: CLINIC | Age: 42
End: 2022-05-10
Attending: INTERNAL MEDICINE
Payer: COMMERCIAL

## 2022-05-10 ENCOUNTER — APPOINTMENT (OUTPATIENT)
Dept: GENERAL RADIOLOGY | Facility: CLINIC | Age: 42
End: 2022-05-10
Attending: PHYSICIAN ASSISTANT
Payer: COMMERCIAL

## 2022-05-10 LAB
ERYTHROCYTE [DISTWIDTH] IN BLOOD BY AUTOMATED COUNT: 12.1 % (ref 10–15)
HCT VFR BLD AUTO: 42.7 % (ref 35–47)
HGB BLD-MCNC: 14.4 G/DL (ref 11.7–15.7)
MCH RBC QN AUTO: 29.4 PG (ref 26.5–33)
MCHC RBC AUTO-ENTMCNC: 33.7 G/DL (ref 31.5–36.5)
MCV RBC AUTO: 87 FL (ref 78–100)
PLATELET # BLD AUTO: 212 10E3/UL (ref 150–450)
RBC # BLD AUTO: 4.9 10E6/UL (ref 3.8–5.2)
WBC # BLD AUTO: 8.1 10E3/UL (ref 4–11)

## 2022-05-10 PROCEDURE — 72148 MRI LUMBAR SPINE W/O DYE: CPT

## 2022-05-10 PROCEDURE — 99225 PR SUBSEQUENT OBSERVATION CARE,LEVEL II: CPT | Performed by: STUDENT IN AN ORGANIZED HEALTH CARE EDUCATION/TRAINING PROGRAM

## 2022-05-10 PROCEDURE — 96376 TX/PRO/DX INJ SAME DRUG ADON: CPT

## 2022-05-10 PROCEDURE — 255N000002 HC RX 255 OP 636: Performed by: INTERNAL MEDICINE

## 2022-05-10 PROCEDURE — G0378 HOSPITAL OBSERVATION PER HR: HCPCS

## 2022-05-10 PROCEDURE — 36415 COLL VENOUS BLD VENIPUNCTURE: CPT | Performed by: INTERNAL MEDICINE

## 2022-05-10 PROCEDURE — 250N000011 HC RX IP 250 OP 636: Performed by: INTERNAL MEDICINE

## 2022-05-10 PROCEDURE — 250N000009 HC RX 250: Performed by: INTERNAL MEDICINE

## 2022-05-10 PROCEDURE — 99204 OFFICE O/P NEW MOD 45 MIN: CPT | Performed by: PHYSICIAN ASSISTANT

## 2022-05-10 PROCEDURE — 250N000013 HC RX MED GY IP 250 OP 250 PS 637: Performed by: INTERNAL MEDICINE

## 2022-05-10 PROCEDURE — 62323 NJX INTERLAMINAR LMBR/SAC: CPT

## 2022-05-10 PROCEDURE — 85014 HEMATOCRIT: CPT | Performed by: INTERNAL MEDICINE

## 2022-05-10 RX ORDER — BUPIVACAINE HYDROCHLORIDE 5 MG/ML
30 INJECTION, SOLUTION PERINEURAL ONCE
Status: COMPLETED | OUTPATIENT
Start: 2022-05-10 | End: 2022-05-10

## 2022-05-10 RX ORDER — NICOTINE POLACRILEX 4 MG
15-30 LOZENGE BUCCAL
Status: DISCONTINUED | OUTPATIENT
Start: 2022-05-10 | End: 2022-05-13 | Stop reason: HOSPADM

## 2022-05-10 RX ORDER — DEXTROSE MONOHYDRATE 25 G/50ML
25-50 INJECTION, SOLUTION INTRAVENOUS
Status: DISCONTINUED | OUTPATIENT
Start: 2022-05-10 | End: 2022-05-13 | Stop reason: HOSPADM

## 2022-05-10 RX ORDER — BETAMETHASONE SODIUM PHOSPHATE AND BETAMETHASONE ACETATE 3; 3 MG/ML; MG/ML
6 INJECTION, SUSPENSION INTRA-ARTICULAR; INTRALESIONAL; INTRAMUSCULAR; SOFT TISSUE ONCE
Status: COMPLETED | OUTPATIENT
Start: 2022-05-10 | End: 2022-05-10

## 2022-05-10 RX ORDER — IOPAMIDOL 408 MG/ML
10 INJECTION, SOLUTION INTRATHECAL ONCE
Status: COMPLETED | OUTPATIENT
Start: 2022-05-10 | End: 2022-05-10

## 2022-05-10 RX ADMIN — HYDROMORPHONE HYDROCHLORIDE 0.5 MG: 1 INJECTION, SOLUTION INTRAMUSCULAR; INTRAVENOUS; SUBCUTANEOUS at 18:47

## 2022-05-10 RX ADMIN — LIDOCAINE HYDROCHLORIDE 6 ML: 10 INJECTION, SOLUTION INFILTRATION; PERINEURAL at 15:04

## 2022-05-10 RX ADMIN — HYDROMORPHONE HYDROCHLORIDE 0.5 MG: 1 INJECTION, SOLUTION INTRAMUSCULAR; INTRAVENOUS; SUBCUTANEOUS at 15:31

## 2022-05-10 RX ADMIN — KETOROLAC TROMETHAMINE 30 MG: 30 INJECTION, SOLUTION INTRAMUSCULAR; INTRAVENOUS at 09:36

## 2022-05-10 RX ADMIN — HYDROMORPHONE HYDROCHLORIDE 0.5 MG: 1 INJECTION, SOLUTION INTRAMUSCULAR; INTRAVENOUS; SUBCUTANEOUS at 05:48

## 2022-05-10 RX ADMIN — IOPAMIDOL 1 ML: 408 INJECTION, SOLUTION INTRATHECAL at 15:02

## 2022-05-10 RX ADMIN — HYDROMORPHONE HYDROCHLORIDE 0.5 MG: 1 INJECTION, SOLUTION INTRAMUSCULAR; INTRAVENOUS; SUBCUTANEOUS at 11:58

## 2022-05-10 RX ADMIN — HYDROMORPHONE HYDROCHLORIDE 0.5 MG: 1 INJECTION, SOLUTION INTRAMUSCULAR; INTRAVENOUS; SUBCUTANEOUS at 01:25

## 2022-05-10 RX ADMIN — ACETAMINOPHEN 975 MG: 325 TABLET ORAL at 07:08

## 2022-05-10 RX ADMIN — BUPIVACAINE HYDROCHLORIDE 3 ML: 5 INJECTION, SOLUTION PERINEURAL at 15:03

## 2022-05-10 RX ADMIN — KETOROLAC TROMETHAMINE 30 MG: 30 INJECTION, SOLUTION INTRAMUSCULAR; INTRAVENOUS at 20:51

## 2022-05-10 RX ADMIN — ACETAMINOPHEN 975 MG: 325 TABLET ORAL at 15:31

## 2022-05-10 RX ADMIN — ACETAMINOPHEN 975 MG: 325 TABLET ORAL at 23:03

## 2022-05-10 RX ADMIN — BETAMETHASONE SODIUM PHOSPHATE AND BETAMETHASONE ACETATE 6 MG: 3; 3 INJECTION, SUSPENSION INTRA-ARTICULAR; INTRALESIONAL; INTRAMUSCULAR at 15:03

## 2022-05-10 RX ADMIN — LIDOCAINE 1 PATCH: 560 PATCH PERCUTANEOUS; TOPICAL; TRANSDERMAL at 20:50

## 2022-05-10 NOTE — ED NOTES
"[facility]  ED Nurse Handoff Report    ED Chief complaint: Back Pain      ED Diagnosis:   Final diagnoses:   Lumbar back pain with radiculopathy affecting left lower extremity       Code Status: Full Code    Allergies: No Known Allergies    Patient Story: pt has had a hx of back pain, states pain is getting worse over the week. Has been seen by chiropractic and PCP and pain management is not working. Denies any fall or trauma  Focused Assessment:  Severe back pain, numbness to the legs     Treatments and/or interventions provided: PIV, IV meds, Labs  Patient's response to treatments and/or interventions: pain is mildly better    To be done/followed up on inpatient unit:  MRI    Does this patient have any cognitive concerns?: AO4    Activity level - Baseline/Home:  Independent  Activity Level - Current:   Unknown    Patient's Preferred language: English   Needed?: No    Isolation: None  Infection: Not Applicable  Patient tested for COVID 19 prior to admission: YES  Bariatric?: No    Vital Signs:   Vitals:    05/09/22 1640   BP: (!) 158/91   Pulse: 115   Resp: 20   Temp: 98.9  F (37.2  C)   TempSrc: Oral   SpO2: 96%   Weight: 129.7 kg (286 lb)   Height: 1.626 m (5' 4\")       Cardiac Rhythm:     Was the PSS-3 completed:   Yes  What interventions are required if any?               Family Comments:  coming  OBS brochure/video discussed/provided to patient/family: Yes              Name of person given brochure if not patient:               Relationship to patient:     For the majority of the shift this patient's behavior was Green.   Behavioral interventions performed were .    ED NURSE PHONE NUMBER: ED RN2         "

## 2022-05-10 NOTE — PLAN OF CARE
Goal Outcome Evaluation:    RECEIVING UNIT ED HANDOFF REVIEW    ED Nurse Handoff Report was reviewed by: Filemon Rodriguez RN on May 9, 2022 at 9:39 PM

## 2022-05-10 NOTE — PROCEDURES
[facility]    Procedure: L4-L5 LIZANDRO    Date/Time: 5/10/2022 3:03 PM  Performed by: Francisco De Santiago PA-C  Authorized by: Francisco De Santiago PA-C       UNIVERSAL PROTOCOL   Site Marked: Yes  Prior Images Obtained and Reviewed:  Yes  Required items: Required blood products, implants, devices and special equipment available    Patient identity confirmed:  Verbally with patient  Patient was reevaluated immediately before administering moderate or deep sedation or anesthesia  Confirmation Checklist:  Patient's identity using two indicators, relevant allergies, procedure was appropriate and matched the consent or emergent situation and correct equipment/implants were available  Time out: Immediately prior to the procedure a time out was called    Universal Protocol: the Joint Commission Universal Protocol was followed    Preparation: Patient was prepped and draped in usual sterile fashion       ANESTHESIA    Local Anesthetic: Lidocaine 1% without epinephrine      SEDATION    Patient Sedated: No    See dictated procedure note for full details.    PROCEDURE  Describe Procedure: Pain from 10 to a 3 after the case.   Patient Tolerance:  Patient tolerated the procedure well with no immediate complications  Length of time physician/provider present for 1:1 monitoring during sedation: 0

## 2022-05-10 NOTE — PLAN OF CARE
Goal Outcome Evaluation:    Plan of Care Reviewed With: patient     Overall Patient Progress: improving        Observation goals  PRIOR TO DISCHARGE          Comments:     -Diagnostic tests and consults completed and resulted -Not met    -Vital signs normal or at patient baseline -Met    -Adequate pain control on oral analgesics -Partially met, still needing I.v dialudid    -Returns to baseline functional status -Not met    Nurse to notify provider when observation goals have been met and patient is ready for discharge.        Summary-Pt here with back and lt hip pain that radiates to the lt leg,hx of disc herniation    Orientation/Cognitive: A&OX4  Observation Goals (Met/ Not Met): Not met  Mobility Level/Assist Equipment: SBA  Fall Risk (Y/N): Y  Behavior Concerns: None  Pain Management: PRN Dilaudid,Toradol& scheduled Tylenol and lidocaine patch  Tele/VS/O2: VSS on RA  ABNL Lab/BG: MRI  Diet: Reg  Bowel/Bladder: Continent B&B, ambulated to bathroom  Skin Concerns: None  Drains/Devices: PIV s/l on rt AC  Tests/Procedures for next shift: CBC,Neuro surg and PT consults  Anticipated DC date & active delays: TBD  Patient Stated Goal for Today: Pain control, ambulate with less pain

## 2022-05-10 NOTE — PROGRESS NOTES
"diagnostic tests and consults completed and resulted No  -vital signs normal or at patient baseline Yes  -adequate pain control on oral analgesics Improving  -returns to baseline functional status  No    Orientation/Cognitive: A&OX4   Observation Goals (Met/ Not Met): No  Mobility Level/Assist Equipment: SBA  Fall Risk (Y/N): yes  Behavior Concerns: None  Pain Management: Tylenol and lido patch given  Tele/VS/O2: VSS on RA, no tele  ABNL Lab/BG: None  Diet: Regular doet  Bowel/Bladder: Continent  Skin Concerns: intact  Drains/Devices: None  Tests/Procedures for next shift: MRI of the L spine and Neurosurg consult  Anticipated DC date & active delays: Pending adequate pain control  Patient Stated Goal for Today: \" get some pain relief\"    "

## 2022-05-10 NOTE — ED PROVIDER NOTES
History   Chief Complaint:  Back Pain       HPI   Haylee Couch is a 41 year old female presents with back pain. Per patient, she has had a herniated disc since January due to shoveling the snow. She has been going to PT and chiropractor followed by X-ray of lumbar as shown below. No MRI has done. She had COVID in 2022. Last friday, she woke up with severe pain, She did some strenuous activity the week before, but no recent fall or activity.  She now can still feel spasm down her left leg to her lateral calf and ankle. She has had tingling down her left leg down to thigh. No trouble with bowel or bladder. She has been feeling hot but no fever. No chills. She could barely walk to the bathroom due to the pain. She has attempted gabapentin at night, prednisone, Neurontin, and flexeril, tramadol with no relief. She took 2 ibuprofen at 2pm. She received Norco here at 445 pm with no significant relief. She denies blood thinners. No recent cough or cold symptoms.     XR Lumbar Spine 2-3 Views on 22  FINDINGS:  2 views obtained of the lumbar spine. Transitional lumbosacral anatomy. No fracture. Grade 1 anterolisthesis of L3 on L4 (counting from the top). Mild multilevel degenerative disc disease.    Review of Systems   Constitutional: Negative for chills and fever.   HENT: Negative for sore throat.    Respiratory: Negative for cough.    Musculoskeletal: Positive for back pain and myalgias.   Neurological: Positive for numbness.   All other systems reviewed and are negative.    Allergies:  No Known Allergies    Medications:  No current outpatient medications on file.    Past Medical History:     Obesity  Acute bronchitis    Past Surgical History:    Cholecystectomy  D & C   x2    Family History:    Father: MI, high cholesterol, hypertension    Social History:  Presents with mother via private car. Never smoker. 2 drinks per week.    Physical Exam     Patient Vitals for the past 24 hrs:   BP Temp Temp  "src Pulse Resp SpO2 Height Weight   05/09/22 2255 (!) 141/76 98.4  F (36.9  C) Oral 73 16 95 % 1.626 m (5' 4\") 129.5 kg (285 lb 9.6 oz)   05/09/22 1640 (!) 158/91 98.9  F (37.2  C) Oral 115 20 96 % 1.626 m (5' 4\") 129.7 kg (286 lb)       Physical Exam  Nursing note and vitals reviewed.  Constitutional:  Appears well-developed and well-nourished.   HENT:   Head:    Atraumatic.   Mouth/Throat:   Oropharynx is clear and moist. No oropharyngeal exudate.   Eyes:    Pupils are equal, round, and reactive to light.   Neck:    Normal range of motion. Neck supple.      No tracheal deviation present. No thyromegaly present.   Cardiovascular:  Normal rate, regular rhythm, no murmur   Pulmonary/Chest: Breath sounds are clear and equal without wheezes or crackles.  Abdominal:   Soft. Bowel sounds are normal. Exhibits no distension and      no mass. There is no tenderness.      There is no rebound and no guarding.  Back exam  nontender back without redness, warmth, or swelling.  Musculoskeletal:  Exhibits no edema.   Lymphadenopathy:  No cervical adenopathy.   Neurological:   Alert and oriented to person, place, and time. GCS 15.  CN 2-12 intact.  and proximal upper extremity strength strong and equal.  Decreased sensation to lateral aspect of the left leg. Sensation intact and equal to the face, arms and legs.  No facial droop or weakness. Normal speech.  Normal strength to the legs, including normal Dorsiflexion/Plantarflexion strength.  Normal saddle area sensation.  Follows commands and answers questions normally.  DTR's 2+/= to bilateral patella and achillis.  strong and equal.    Skin:    Skin is warm and dry. No rash noted. No pallor.       Emergency Department Course     Imaging:  Lumbar spine MRI w/o contrast    (Results Pending)     Report per radiology    Laboratory:  Labs Ordered and Resulted from Time of ED Arrival to Time of ED Departure   BASIC METABOLIC PANEL - Normal       Result Value    Sodium 141      " Potassium 3.4      Chloride 109      Carbon Dioxide (CO2) 25      Anion Gap 7      Urea Nitrogen 17      Creatinine 0.71      Calcium 8.8      Glucose 87      GFR Estimate >90     COVID-19 VIRUS (CORONAVIRUS) BY PCR - Normal    SARS CoV2 PCR Negative     CBC WITH PLATELETS AND DIFFERENTIAL    WBC Count 10.0      RBC Count 4.75      Hemoglobin 13.8      Hematocrit 41.9      MCV 88      MCH 29.1      MCHC 32.9      RDW 12.0      Platelet Count 219      % Neutrophils 69      % Lymphocytes 21      % Monocytes 8      % Eosinophils 0      % Basophils 1      % Immature Granulocytes 1      NRBCs per 100 WBC 0      Absolute Neutrophils 7.0      Absolute Lymphocytes 2.1      Absolute Monocytes 0.8      Absolute Eosinophils 0.0      Absolute Basophils 0.1      Absolute Immature Granulocytes 0.1      Absolute NRBCs 0.0        Emergency Department Course:     Reviewed:  I reviewed nursing notes, vitals, past medical history and Care Everywhere    Assessments:  2011 I obtained history and examined the patient as noted above.    I rechecked the patient and explained findings.     Consults:  2113 I spoke with Dr. Swan of the hospitalist service regarding patient's presentation, findings, and plan of care.    Interventions:  1645 Norco 2 tablet po  2106 Toradol 15mg IV  2241 Dilaudid 0.5mg IV    Disposition:  The patient was admitted to the hospital under the care of Dr. Swan.     Impression & Plan     Medical Decision Making:  I found the patient with retractable lower back pain with radiculopathy. The patient was in severe pain despite of opioid and multiple other pain related medications. She was the admitted under the care of Dr. Swan, and MRI of her low back was ordered. She does not have any sign of cauda equina syndrome, epidural abscess, discitis or hematoma.      Diagnosis:    ICD-10-CM    1. Lumbar back pain with radiculopathy affecting left lower extremity  M54.16        Scribe Disclosure:  Melanie GÓMEZ,  am serving as a scribe at 8:16 PM on 5/9/2022 to document services personally performed by Dorinda Torrez MD based on my observations and the provider's statements to me.          Dorinda Torrez MD  05/10/22 0045

## 2022-05-10 NOTE — PLAN OF CARE
Goal Outcome Evaluation:    Plan of Care Reviewed With: patient     Overall Patient Progress: improving        Observation goals  PRIOR TO DISCHARGE          Comments:     -Diagnostic tests and consults completed and resulted -Not met    -Vital signs normal or at patient baseline -Met    -Adequate pain control on oral analgesics -Partially met, still needing I.v dialudid    -Returns to baseline functional status -Not met    Nurse to notify provider when observation goals have been met and patient is ready for discharge.

## 2022-05-10 NOTE — CONSULTS
[facility]    Neurosurgery Consultation     Date of Admission:  5/9/2022  Date of Consult (When I saw the patient): 05/10/22    Assessment & Plan   Haylee Couch is a 41 year old female who was admitted on 5/9/2022. I was asked to see the patient for back and leg pain. She noted a flare of back and left leg pain in January, which resolved with therapy and oral meds. She has had flares multiple times over the years, typically resolving with conservative measures. She now notes severe back and left leg pain since last Thursday, which has been quite debilitating. Her leg pain is described as starting in the left hip/buttock, and radiating down the thigh, across the knee into the shin. She presented to  for pain control. MRI was done, and shows lumbar stenosis in the lateral recess on the left at L4-5, as well as more stenosis centrally at L5-S1. Please see read for full interpretation.     She denies any bowel or bladder changes.     Active Problems:    Lumbar back pain with radiculopathy affecting left lower extremity    Assessment: multilevel lumbar stenosis with correlating left leg pain.     Plan: She has had PT and oral meds. We discussed possible benefits of lumbar LIZANDRO, and she did wish to try that. If she improves with that, she can certainly follow-up with us as outpatient to discuss further options as needed.       I have discussed the following assessment and plan with Dr. Escoto who is in agreement with the initial plan and will follow up with further consultation recommendations.    [facility]  [address] [address]    Tel [phone]  Pager [phone]        Code Status    Full Code    Reason for Consult   Reason for consult: back pain and leg pain    Primary Care Physician   [facility]    Chief Complaint   Back and leg pain    History is obtained from the  patient and electronic health record    History of Present Illness   Haylee Couch is a 41 year old female who presents with back and leg pain. She noted a flare of back and left leg pain in January, which resolved with therapy and oral meds. She has had flares multiple times over the years, typically resolving with conservative measures. She now notes severe back and left leg pain since last Thursday, which has been quite debilitating. Her leg pain is described as starting in the left hip/buttock, and radiating down the thigh, across the knee into the shin. She presented to  for pain control. MRI was done, and shows lumbar stenosis in the lateral recess on the left at L4-5, as well as more stenosis centrally at L5-S1. Please see read for full interpretation.     Past Medical History   I have reviewed this patient's medical history and updated it with pertinent information if needed.   No past medical history on file.    Past Surgical History   I have reviewed this patient's surgical history and updated it with pertinent information if needed.  Past Surgical History:   Procedure Laterality Date     CHOLECYSTECTOMY       DILATION AND CURETTAGE, OPERATIVE HYSTEROSCOPY WITH MORCELLATOR, COMBINED N/A 4/25/2017    Procedure: COMBINED DILATION AND CURETTAGE, OPERATIVE HYSTEROSCOPY WITH MORCELLATOR;  DILATION AND CURETTAGE, HYSTEROSCOPY, Polypectomy WITH MORCELLATOR;  Surgeon: Galileo Rm MD;  Location: [facility] OR     GYN SURGERY      c/s x2       Prior to Admission Medications   Prior to Admission Medications   Prescriptions Last Dose Informant Patient Reported? Taking?   cyclobenzaprine (FLEXERIL) 5 MG tablet 5/9/2022 at 1400 Self Yes Yes   Sig: Take 5 mg by mouth 3 times daily as needed for muscle spasms   gabapentin (NEURONTIN) 300 MG capsule 5/8/2022 at 300 mg @HS Self Yes Yes   Sig: Take 300-600 mg by mouth At Bedtime   ibuprofen (ADVIL/MOTRIN) 200 MG tablet 5/9/2022 at 800 mg @1400 Self Yes Yes   Sig: Take  "400-800 mg by mouth every 8 hours as needed for mild pain   levonorgestrel-ethinyl estradiol (KURVELO) 0.15-30 MG-MCG tablet 5/8/2022 at PM Self Yes Yes   Sig: Take 1 tablet by mouth daily   multivitamin w/minerals (THERA-VIT-M) tablet 5/9/2022 at Unknown time Self Yes Yes   Sig: Take 1 tablet by mouth daily   predniSONE (DELTASONE) 20 MG tablet 5/9/2022 at 20 mg in AM Self Yes Yes   Sig: Take 20 mg by mouth 2 times daily Prescribed 40 mg daily. Take for 7 days.   traMADol (ULTRAM) 50 MG tablet 5/9/2022 at 50 mg @1400 Self Yes Yes   Sig: Take  mg by mouth every 8 hours as needed for severe pain      Facility-Administered Medications: None     Allergies   No Known Allergies    Social History   I have reviewed this patient's social history and updated it with pertinent information if needed. Haylee Couch  reports that she has never smoked. She does not have any smokeless tobacco history on file. She reports current alcohol use. She reports that she does not use drugs.    Family History   I have reviewed this patient's family history and updated it with pertinent information if needed.   No family history on file.    Review of Systems   10 point review of systems is negative with the exception of HPI and PMH.     Physical Exam   Temp: 98.1  F (36.7  C) Temp src: Oral BP: 136/85 Pulse: 67   Resp: 16 SpO2: 96 % O2 Device: None (Room air)    Vital Signs with Ranges  Temp:  [98.1  F (36.7  C)-98.9  F (37.2  C)] 98.1  F (36.7  C)  Pulse:  [] 67  Resp:  [16-20] 16  BP: (133-158)/(76-91) 136/85  SpO2:  [95 %-98 %] 96 %  285 lbs 9.6 oz     , Blood pressure 136/85, pulse 67, temperature 98.1  F (36.7  C), temperature source Oral, resp. rate 16, height 1.626 m (5' 4\"), weight 129.5 kg (285 lb 9.6 oz), SpO2 96 %.  285 lbs 9.6 oz  HEENT:  Normocephalic, atraumatic.  PERRLA.  EOM s intact.   Neck:  Supple, non-tender, without lymphadenopathy.  Heart:  No peripheral edema  Lungs:  No SOB  Abdomen:  Soft, non-tender, " non-distended.  Skin:  Warm and dry, good capillary refill.  Extremities:  Good radial and dorsalis pedis pulses bilaterally, no edema, cyanosis or clubbing.    NEUROLOGICAL EXAMINATION:   Mental status:  Alert and Oriented x 3, speech is fluent.  Cranial nerves:  II-XII intact.   Motor:  Strength is 5/5 throughout the upper and lower extremities   Hip Flexor:                Right: 5/5  Left:  5/5  Hip Adductor:             Right:  5/5  Left:  5/5  Hip Abductor:             Right:  5/5  Left:  5/5  Gastroc Soleus:        Right:  5/5  Left:  5/5  Tib/Ant:                      Right:  5/5  Left:  5/5  EHL:                     Right:  5/5  Left:  5/5  Sensation:  intact  Reflexes:   Negative Babinski.  Negative Clonus.        Data   All new lab and imaging data was personally reviewed by me.    CT:    MRI:Nomenclature is based on 5 lumbar type vertebral bodies. Normal vertebral body heights. Straightening of the normal lumbar lordosis. A few scattered appearing subcentimeter osseous hemangiomas are present involving the L1, L4 and L5 vertebral bodies. No   suspicious marrow edema identified. Normal distal spinal cord. The conus terminates at L1. Normal cauda equina nerve roots. No extraspinal abnormality. Unremarkable visualized bony pelvis.     T12-L1: Normal disc height and signal. No disc herniation. No canal or foraminal stenosis.      L1-L2: Normal disc height and signal. No disc herniation. Bilateral facet arthropathy. No significant canal or foraminal stenosis.     L2-L3: Normal disc height and signal. Bilateral facet arthropathy. No significant disc herniation. No canal or foraminal stenosis.      L3-L4: Disc desiccation. Minimal disc height loss. Bilateral facet arthropathy. Mild ligamentum flavum thickening. Trace concentric disc bulge. Prominence of the dorsal epidural fat. Mild to moderate central spinal canal stenosis. No foraminal stenosis.     L4-L5: Disc desiccation. Disc height loss. Concentric disc  bulge with a superimposed left lateral recess disc extrusion. Bilateral facet arthropathy. Trace fluid in the left facet joint. Moderate to severe central spinal canal stenosis with severe left   lateral recess stenosis and contact of the descending left-sided cauda equina nerve roots. Mild prominence of the dorsal epidural fat. No significant right neural foraminal stenosis. No significant left neural foraminal stenosis.     L5-S1: Disc desiccation. Disc height loss. Concentric disc bulge, eccentric to the left laterally. There are superimposed midline and left lateral recess disc protrusions at this level. There is a high intensity zone/annular tear involving the midline   disc protrusion. At this level there is minimal central spinal canal stenosis with moderate left lateral recess stenosis and apparent contact of the descending left S1 cauda equina nerve root (image 49 series 9, image 9 series 10 and image 13 series 6).   There is no significant neural foraminal stenosis.                                                                      IMPRESSION:  1.  Multilevel degenerative changes of lumbar spine, as above.  2.  At L4-L5, there is a concentric disc bulge with a superimposed left lateral recess disc extrusion. At this level, the disc extrusion contacts the descending left-sided cauda equina nerve roots. There is moderate to severe central spinal canal   stenosis with severe left lateral recess stenosis at this level.  3.  At L5-S1, there is a concentric disc bulge, eccentric to the left laterally. There are superimposed midline and left lateral recess disc protrusions with moderate left lateral recess stenosis and apparent contact of the descending left S1 cauda   equina nerve root. Overall, there is minimal central spinal canal stenosis without significant neural foraminal stenosis at this level.  4.  Additional findings above.    CBC RESULTS:   Recent Labs   Lab Test 05/10/22  0625   WBC 8.1   RBC 4.90    HGB 14.4   HCT 42.7   MCV 87   MCH 29.4   MCHC 33.7   RDW 12.1        Basic Metabolic Panel:  Lab Results   Component Value Date     05/09/2022     06/03/2006      Lab Results   Component Value Date    POTASSIUM 3.4 05/09/2022    POTASSIUM 3.5 06/03/2006     Lab Results   Component Value Date    CHLORIDE 109 05/09/2022    CHLORIDE 106 06/03/2006     Lab Results   Component Value Date    MAYTE 8.8 05/09/2022    MAYTE 9.0 06/03/2006     Lab Results   Component Value Date    CO2 25 05/09/2022    CO2 27 06/03/2006     Lab Results   Component Value Date    BUN 17 05/09/2022    BUN 18 06/03/2006     Lab Results   Component Value Date    CR 0.71 05/09/2022    CR 1.00 06/03/2006     Lab Results   Component Value Date    GLC 87 05/09/2022     06/03/2006     INR:  No results found for: INR

## 2022-05-10 NOTE — PROGRESS NOTES
Observation goals  PRIOR TO DISCHARGE       Comments:   -diagnostic tests and consults completed and resulted:Partially met     -vital signs normal or at patient baseline: Partially met, slightly elevated BP.     -adequate pain control on oral analgesics: Not met, continues to need IV pain meds.     -returns to baseline functional status: Not met        Nurse to notify provider when observation goals have been met and patient is ready for discharge.

## 2022-05-10 NOTE — PROGRESS NOTES
[facility]    Medicine Progress Note - Hospitalist Service    Date of Admission:  5/9/2022  Date of Service: 05/10/2022    Assessment & Plan            Haylee Couch is a 41 year old female who presents with back pain    Back pain  Reported herniated disc since 1/2022 after shoveling snow. Has done PT and seen a chiropractor, no MRI yet. 5/6 woke with severe pain, XR done and give meds. Pain down L leg, no bowel or bladder issues. Describes radicular L5/S1/S2 area. States meds from primary (gabapentin, flexeril, tramadol, pred) of no benefit. Vitals stable ex . Recent labs 5/6 normal. MRI lumbar spine shows L4-L5, there is a concentric disc bulge with a superimposed left lateral recess disc extrusion. At this level, the disc extrusion contacts the descending left-sided cauda equina nerve roots. There is moderate to severe central spinal canal  stenosis with severe left lateral recess stenosis at this level.  Plan:  - consulted neurosurgery -> L4-L5 LIZANDRO completed today  - prn analgesics- acetaminophen scheduled, toradol, IV hydromorphone  - lidoderm patch  - PT    Morbid obesity  BMI 49. Encourage weight loss and healthy lifestyle.     COVID-19 negative       Diet: Advance Diet as Tolerated: Clear Liquid Diet    DVT Prophylaxis: Pneumatic Compression Devices  Marino Catheter: Not present  Central Lines: None  Cardiac Monitoring: None  Code Status: Full Code      Disposition Plan   Expected Discharge: 05/11/2022     Anticipated discharge location:  Awaiting care coordination huddle  Delays:          The patient's care was discussed with the Bedside Nurse and Patient.    Stevan Houser MD  Hospitalist Service  [facility]  Securely message with the Vocera Web Console (learn more here)  Text page via [employer] Paging/Directory         Clinically Significant Risk Factors Present on Admission                  # Severe Obesity: Estimated body mass index is 49.02 kg/m  as  "calculated from the following:    Height as of this encounter: 1.626 m (5' 4\").    Weight as of this encounter: 129.5 kg (285 lb 9.6 oz).      ______________________________________________________________________    Interval History     Back improved after LIZANDRO  Still with left leg pain  No new numbness or pain otherwise  No fevers or chills  No CP/SOB  No nausea / vomiting    Data reviewed today: I reviewed all medications, new labs and imaging results over the last 24 hours. I personally reviewed no images or EKG's today.    Physical Exam   Vital Signs: Temp: 98  F (36.7  C) Temp src: Oral BP: (!) 148/90 Pulse: 74   Resp: 18 SpO2: 95 % O2 Device: None (Room air)    Weight: 285 lbs 9.6 oz    Constitutional: no apparent distress  Respiratory: CTA B without w/c  Cardiovascular: RRR no murmur. No edema.  GI: soft, obese, BS present  Skin: no rashes or lesions grossly  Musculoskeletal: no deformities or arthritis. Deferred LE exam 2/2 pain  Neurologic:  OREILLY, no focal deficits  Psychiatric: mood and affect wnl       Data   Recent Labs   Lab 05/10/22  0625 05/09/22  2108   WBC 8.1 10.0   HGB 14.4 13.8   MCV 87 88    219   NA  --  141   POTASSIUM  --  3.4   CHLORIDE  --  109   CO2  --  25   BUN  --  17   CR  --  0.71   ANIONGAP  --  7   MAYTE  --  8.8   GLC  --  87     Recent Results (from the past 24 hour(s))   Lumbar spine MRI w/o contrast    Narrative    EXAM: MR LUMBAR SPINE W/O CONTRAST  LOCATION: [facility]  DATE/TIME: 5/10/2022 1:48 AM    INDICATION: Left radiculopathy. Severe left low back pain. Pain and tingling down left leg.  COMPARISON: None.  TECHNIQUE: Routine Lumbar Spine MRI without IV contrast.    FINDINGS:   Nomenclature is based on 5 lumbar type vertebral bodies. Normal vertebral body heights. Straightening of the normal lumbar lordosis. A few scattered appearing subcentimeter osseous hemangiomas are present involving the L1, L4 and L5 vertebral bodies. No   suspicious " marrow edema identified. Normal distal spinal cord. The conus terminates at L1. Normal cauda equina nerve roots. No extraspinal abnormality. Unremarkable visualized bony pelvis.    T12-L1: Normal disc height and signal. No disc herniation. No canal or foraminal stenosis.     L1-L2: Normal disc height and signal. No disc herniation. Bilateral facet arthropathy. No significant canal or foraminal stenosis.    L2-L3: Normal disc height and signal. Bilateral facet arthropathy. No significant disc herniation. No canal or foraminal stenosis.     L3-L4: Disc desiccation. Minimal disc height loss. Bilateral facet arthropathy. Mild ligamentum flavum thickening. Trace concentric disc bulge. Prominence of the dorsal epidural fat. Mild to moderate central spinal canal stenosis. No foraminal stenosis.    L4-L5: Disc desiccation. Disc height loss. Concentric disc bulge with a superimposed left lateral recess disc extrusion. Bilateral facet arthropathy. Trace fluid in the left facet joint. Moderate to severe central spinal canal stenosis with severe left   lateral recess stenosis and contact of the descending left-sided cauda equina nerve roots. Mild prominence of the dorsal epidural fat. No significant right neural foraminal stenosis. No significant left neural foraminal stenosis.    L5-S1: Disc desiccation. Disc height loss. Concentric disc bulge, eccentric to the left laterally. There are superimposed midline and left lateral recess disc protrusions at this level. There is a high intensity zone/annular tear involving the midline   disc protrusion. At this level there is minimal central spinal canal stenosis with moderate left lateral recess stenosis and apparent contact of the descending left S1 cauda equina nerve root (image 49 series 9, image 9 series 10 and image 13 series 6).   There is no significant neural foraminal stenosis.      Impression    IMPRESSION:  1.  Multilevel degenerative changes of lumbar spine, as above.  2.   At L4-L5, there is a concentric disc bulge with a superimposed left lateral recess disc extrusion. At this level, the disc extrusion contacts the descending left-sided cauda equina nerve roots. There is moderate to severe central spinal canal   stenosis with severe left lateral recess stenosis at this level.  3.  At L5-S1, there is a concentric disc bulge, eccentric to the left laterally. There are superimposed midline and left lateral recess disc protrusions with moderate left lateral recess stenosis and apparent contact of the descending left S1 cauda   equina nerve root. Overall, there is minimal central spinal canal stenosis without significant neural foraminal stenosis at this level.  4.  Additional findings above.   XR Lumbar Translaminar Inj Single    Narrative    XR LUMBAR TRANSLAMINAR INJ INCL IMAGING                5/10/2022 3:08  PM       History:  Low back and left leg radiculopathy. Request for L4-5  interlaminar epidural steroid injection.     PROCEDURE:  The procedure, indications, risks (including the risk of  infection, bleeding and reaction to contrast material and  medications), and alternatives to therapy were discussed with the  patient and informed consent was obtained for the procedure.  The  lower back was prepped and draped in the usual fashion.  Lidocaine 1%  was used for local anesthesia.  Using fluoroscopic guidance, a  22-gauge spinal needle was advanced into the epidural space via  interlaminar approach at the L4-L5 level.  Loss of resistance and a  small amount of contrast was injected confirming epidural location of  the needle.  Subsequently, a mixture of 3 mL Celestone and 3 mL  Lidocaine 1% was injected.  The needle was removed.  Estimated blood  loss during the procedure was less than 5 mL. No specimens collected.  The patient tolerated the procedure well and there were no immediate  complications.        Fluoro time: 0.4 minutes  Images Obtained: 9  Medications: 3 mL 1%  lidocaine, 1 ml of Isovue m200, 18 mg of  betamethasone, 3 mL 1% lidocaine.    Patient's pain levels (0-10 scale) are as follows:    PRE INJECTION     Low back                 10     Right leg                  0   Left leg                     10      POST INJECTION   Low back                 3    Right leg                  0    Left leg                     4         Impression    IMPRESSION:  Technically successful lumbar translaminar epidural  steroid injection at L4-5.  Long-term results are pending.     JOHN DAVILA PA-C         SYSTEM ID:  [id number]     Medications       acetaminophen  975 mg Oral Q8H     lidocaine  1 patch Transdermal Q24h    And     lidocaine   Transdermal Q8H

## 2022-05-10 NOTE — PROGRESS NOTES
Observation goals  PRIOR TO DISCHARGE       Comments:   -diagnostic tests and consults completed and resulted:  Not met, neurosurgery yet to eval pt.     -vital signs normal or at patient baseline: Met    -adequate pain control on oral analgesics: Not met, continues to need IV pain meds.    -returns to baseline functional status: Not met      Nurse to notify provider when observation goals have been met and patient is ready for discharge.

## 2022-05-10 NOTE — H&P
[facility]    History and Physical  Hospitalist       Date of Admission:  5/9/2022  Date of Service (when I saw the patient): 05/09/22    Assessment & Plan   Haylee Couch is a 41 year old female who presents with back pain    Back pain  Reported herniated disc since 1/2022 after shoveling snow. Has done PT and seen a chiropractor, no MRI yet. 5/6 woke with severe pain, XR done and give meds. Pain down L leg, no bowel or bladder issues. Describes radicular L5/S1/S2 area. States meds from primary (gabapentin, flexeril, tramadol, pred) of no benefit. Vitals stable ex . Recent labs 5/6 normal.   - check CBC in am  - MRI back  - consult neurosurgery  - prn analgesics- acetaminophen scheduled, toradol, IV hydromorphone  - lidoderm patch  - PT    Morbid obesity  BMI 49. Encourage weight loss and healthy lifestyle.     COVID-19 negative    DVT Prophylaxis: Pneumatic Compression Devices  Code Status: Full Code    Disposition: Expected discharge in 2-3 days     Emeka Swan MD, MD  [phone] (P)  Text Page     Primary Care Physician   [facility]    Chief Complaint   Back/ leg pain    History is obtained from the patient and medical records    History of Present Illness   Haylee Couch is a 41 year old female who presents with back pain.  She reports that in January she was out side shoveling and playing with her son in the snow when she threw her back out.  At the same time she had COVID which limited her ability to go to see a provider.  She did have a FaceTime with her family medic some doctor who prescribed pain medications and physical therapy.  She was given oxycodone codon which she did not think worked.  She states she did 6 physical therapy appointments but still had pain with standing.  She ultimately went to see a chiropractor who helped her she was unable to stand and use Advil as needed.  She notes on Thursday and Friday her back pain worsened.  By the  weekend she could not walk or get off the floor.  She describes the pain is in her lower back and then radiates down her left hip and buttock area, down her thigh and into her calf.  She has no incontinence.  She had no fevers or chills.  She does note her neck and shoulder are stiff but she thinks is because she has been laying on her side so much.  She denies chest pain, shortness of breath, nausea vomiting, or abdominal pain.    Past Medical History    I have reviewed this patient's medical history and updated it with pertinent information if needed.   Denies medical history    Past Surgical History   I have reviewed this patient's surgical history and updated it with pertinent information if needed.  Past Surgical History:   Procedure Laterality Date     CHOLECYSTECTOMY       DILATION AND CURETTAGE, OPERATIVE HYSTEROSCOPY WITH MORCELLATOR, COMBINED N/A 4/25/2017    Procedure: COMBINED DILATION AND CURETTAGE, OPERATIVE HYSTEROSCOPY WITH MORCELLATOR;  DILATION AND CURETTAGE, HYSTEROSCOPY, Polypectomy WITH MORCELLATOR;  Surgeon: Galileo Rm MD;  Location: [facility] OR     GYN SURGERY      c/s x2       Prior to Admission Medications   Prior to Admission Medications   Prescriptions Last Dose Informant Patient Reported? Taking?   oxyCODONE (ROXICODONE) 5 MG IR tablet   No No   Sig: Take 1-2 tablets (5-10 mg) by mouth every 3 hours as needed for pain or other (Moderate to Severe)      Facility-Administered Medications: None     Allergies   No Known Allergies    Social History   I have reviewed this patient's social history and updated it with pertinent information if needed. Haylee Couch  reports that she has never smoked. She does not have any smokeless tobacco history on file. She reports current alcohol use. She reports that she does not use drugs.    Family History   I have reviewed this patient's family history and updated it with pertinent information if needed.   Father with back problems, MI    Review of  Systems   The 10 point Review of Systems is negative other than noted in the HPI or here.     Physical Exam   Temp: 98.9  F (37.2  C) Temp src: Oral BP: (!) 158/91 Pulse: 115   Resp: 20 SpO2: 96 % O2 Device: None (Room air)    Vital Signs with Ranges  286 lbs 0 oz    Constitutional: alert, oriented and in no acute distress  Eyes: EOMI, PERRL  HEENT: OP clear  Respiratory: CTA B without w/c  Cardiovascular: RRR no murmur. No edema.  GI: soft, obese, BS presen\t  Lymph/Hematologic: no cervical LAD  Genitourinary: deferred  Skin: no rashes or lesions grossly  Musculoskeletal: no deformities or arthritis. Deferred LE exam 2/2 pain  Neurologic: CN II-XII, OREILLY  Psychiatric: mood and affect wnl    Data   Data reviewed today:  I personally reviewed no images or EKG's today.  No lab results found in last 7 days.    No results found for this or any previous visit (from the past 24 hour(s)).

## 2022-05-11 ENCOUNTER — APPOINTMENT (OUTPATIENT)
Dept: PHYSICAL THERAPY | Facility: CLINIC | Age: 42
End: 2022-05-11
Attending: INTERNAL MEDICINE
Payer: COMMERCIAL

## 2022-05-11 PROCEDURE — G0378 HOSPITAL OBSERVATION PER HR: HCPCS

## 2022-05-11 PROCEDURE — 97161 PT EVAL LOW COMPLEX 20 MIN: CPT | Mod: GP | Performed by: PHYSICAL THERAPIST

## 2022-05-11 PROCEDURE — 250N000013 HC RX MED GY IP 250 OP 250 PS 637: Performed by: INTERNAL MEDICINE

## 2022-05-11 PROCEDURE — 96376 TX/PRO/DX INJ SAME DRUG ADON: CPT

## 2022-05-11 PROCEDURE — 250N000011 HC RX IP 250 OP 636: Performed by: INTERNAL MEDICINE

## 2022-05-11 PROCEDURE — 97116 GAIT TRAINING THERAPY: CPT | Mod: GP | Performed by: PHYSICAL THERAPIST

## 2022-05-11 PROCEDURE — 97530 THERAPEUTIC ACTIVITIES: CPT | Mod: GP | Performed by: PHYSICAL THERAPIST

## 2022-05-11 PROCEDURE — 99224 PR SUBSEQUENT OBSERVATION CARE,LEVEL I: CPT | Performed by: INTERNAL MEDICINE

## 2022-05-11 RX ORDER — AMOXICILLIN 250 MG
1 CAPSULE ORAL 2 TIMES DAILY
Status: DISCONTINUED | OUTPATIENT
Start: 2022-05-11 | End: 2022-05-13 | Stop reason: HOSPADM

## 2022-05-11 RX ORDER — HYDROMORPHONE HYDROCHLORIDE 2 MG/1
4 TABLET ORAL
Status: DISCONTINUED | OUTPATIENT
Start: 2022-05-11 | End: 2022-05-13 | Stop reason: HOSPADM

## 2022-05-11 RX ORDER — HYDROMORPHONE HYDROCHLORIDE 1 MG/ML
0.3 INJECTION, SOLUTION INTRAMUSCULAR; INTRAVENOUS; SUBCUTANEOUS
Status: DISCONTINUED | OUTPATIENT
Start: 2022-05-11 | End: 2022-05-13 | Stop reason: HOSPADM

## 2022-05-11 RX ORDER — NAPROXEN 500 MG/1
500 TABLET ORAL 2 TIMES DAILY WITH MEALS
Status: DISCONTINUED | OUTPATIENT
Start: 2022-05-11 | End: 2022-05-13 | Stop reason: HOSPADM

## 2022-05-11 RX ORDER — METHOCARBAMOL 500 MG/1
500 TABLET, FILM COATED ORAL 4 TIMES DAILY
Status: DISCONTINUED | OUTPATIENT
Start: 2022-05-11 | End: 2022-05-13 | Stop reason: HOSPADM

## 2022-05-11 RX ORDER — PANTOPRAZOLE SODIUM 40 MG/1
40 TABLET, DELAYED RELEASE ORAL
Status: DISCONTINUED | OUTPATIENT
Start: 2022-05-11 | End: 2022-05-13 | Stop reason: HOSPADM

## 2022-05-11 RX ORDER — OXYCODONE HYDROCHLORIDE 5 MG/1
10 TABLET ORAL EVERY 4 HOURS PRN
Status: DISCONTINUED | OUTPATIENT
Start: 2022-05-11 | End: 2022-05-11

## 2022-05-11 RX ORDER — GABAPENTIN 300 MG/1
300 CAPSULE ORAL 3 TIMES DAILY
Status: DISCONTINUED | OUTPATIENT
Start: 2022-05-11 | End: 2022-05-13 | Stop reason: HOSPADM

## 2022-05-11 RX ORDER — OXYCODONE HYDROCHLORIDE 5 MG/1
5 TABLET ORAL EVERY 4 HOURS PRN
Status: DISCONTINUED | OUTPATIENT
Start: 2022-05-11 | End: 2022-05-11

## 2022-05-11 RX ADMIN — OXYCODONE HYDROCHLORIDE 5 MG: 5 TABLET ORAL at 09:47

## 2022-05-11 RX ADMIN — NAPROXEN 500 MG: 500 TABLET ORAL at 17:20

## 2022-05-11 RX ADMIN — HYDROMORPHONE HYDROCHLORIDE 0.3 MG: 1 INJECTION, SOLUTION INTRAMUSCULAR; INTRAVENOUS; SUBCUTANEOUS at 20:29

## 2022-05-11 RX ADMIN — HYDROMORPHONE HYDROCHLORIDE 0.5 MG: 1 INJECTION, SOLUTION INTRAMUSCULAR; INTRAVENOUS; SUBCUTANEOUS at 07:48

## 2022-05-11 RX ADMIN — METHOCARBAMOL 500 MG: 500 TABLET ORAL at 20:00

## 2022-05-11 RX ADMIN — HYDROMORPHONE HYDROCHLORIDE 4 MG: 2 TABLET ORAL at 17:20

## 2022-05-11 RX ADMIN — ACETAMINOPHEN 975 MG: 325 TABLET ORAL at 22:53

## 2022-05-11 RX ADMIN — OXYCODONE HYDROCHLORIDE 5 MG: 5 TABLET ORAL at 12:47

## 2022-05-11 RX ADMIN — HYDROMORPHONE HYDROCHLORIDE 4 MG: 2 TABLET ORAL at 22:53

## 2022-05-11 RX ADMIN — LIDOCAINE 1 PATCH: 560 PATCH PERCUTANEOUS; TOPICAL; TRANSDERMAL at 20:00

## 2022-05-11 RX ADMIN — PANTOPRAZOLE SODIUM 40 MG: 40 TABLET, DELAYED RELEASE ORAL at 15:29

## 2022-05-11 RX ADMIN — SENNOSIDES AND DOCUSATE SODIUM 1 TABLET: 50; 8.6 TABLET ORAL at 20:00

## 2022-05-11 RX ADMIN — HYDROMORPHONE HYDROCHLORIDE 0.5 MG: 1 INJECTION, SOLUTION INTRAMUSCULAR; INTRAVENOUS; SUBCUTANEOUS at 01:28

## 2022-05-11 RX ADMIN — METHOCARBAMOL 500 MG: 500 TABLET ORAL at 17:20

## 2022-05-11 RX ADMIN — ACETAMINOPHEN 975 MG: 325 TABLET ORAL at 15:29

## 2022-05-11 RX ADMIN — GABAPENTIN 300 MG: 300 CAPSULE ORAL at 20:00

## 2022-05-11 RX ADMIN — ACETAMINOPHEN 975 MG: 325 TABLET ORAL at 06:12

## 2022-05-11 RX ADMIN — HYDROMORPHONE HYDROCHLORIDE 0.3 MG: 1 INJECTION, SOLUTION INTRAMUSCULAR; INTRAVENOUS; SUBCUTANEOUS at 15:28

## 2022-05-11 NOTE — PROGRESS NOTES
[facility]  Neurosurgery Daily Progress Note    Assessment & Plan   41F admitted with low back and left leg pain. MRI shows L4-L5 left lateral recess disc extrusion with spinal canal stenosis and severe left lateral recess stenosis; also L5-S1 moderate left lateral recess stenosis. She underwent L4-5 LIZANDRO yesterday. She reports minimal improvement in back and leg pain today. She reports 7/10 low back pain, and 9/10 left leg pain radiating from buttocks to lateral thigh, shin, and ankle. She notes some tingling in the left leg as well.    Plan:   - Continue to monitor symptoms to assess for further improvement after LIZANDRO   - Continue pain control measures and PT  - Will plan for outpatient follow-up with Dr. Escoto to discuss possible surgery if symptoms do not improve with conservative measures     Discussed with Dr. Escoto    ADDENDUM 2:11PM   Patient requested to meet with NSG team again to discuss possible surgical options. Per Dr. Escoto, surgery would include MIS L4-5 lami and discectomy. Discussed pre op education with patient. Will continue with the care plan as stated above. Patient voiced agreement with plan.     Rosalind Ruffin, CNP  [facility] Neurosurgery  [facility]  [address] [place] [address]  Tel [phone]  Pager [phone]    Interval History   Minimal improvement after LIZANDRO     Physical Exam   Temp: 98.1  F (36.7  C) Temp src: Oral BP: (!) 147/90 Pulse: 68   Resp: 18 SpO2: 97 % O2 Device: None (Room air)    Vitals:    05/09/22 1640 05/09/22 2255   Weight: 129.7 kg (286 lb) 129.5 kg (285 lb 9.6 oz)     Vital Signs with Ranges  Temp:  [98  F (36.7  C)-98.7  F (37.1  C)] 98.1  F (36.7  C)  Pulse:  [67-75] 68  Resp:  [16-18] 18  BP: (132-148)/(78-97) 147/90  SpO2:  [93 %-97 %] 97 %  I/O last 3 completed shifts:  In: 240 [P.O.:240]  Out: -     Mental status:  Alert and oriented x 3, speech is fluent.  Motor:    Moves all  extremities  5/5 strength in BLE  Some pain when lifting LLE off the bed   Sensation:  Intact    Medications        acetaminophen  975 mg Oral Q8H     lidocaine  1 patch Transdermal Q24h    And     lidocaine   Transdermal Q8H       Rosalind Ruffin CNP  [facility] Neurosurgery   [address] [address]  Tel [phone]  Pager [phone]

## 2022-05-11 NOTE — PROGRESS NOTES
Orientation/Cognitive: A&OX4  Observation Goals (Met/ Not Met): Not met  Mobility Level/Assist Equipment: SBA  Fall Risk (Y/N): Y  Behavior Concerns: None  Pain Management: PRN Dilaudid,Toradol& scheduled Tylenol and lidocaine patch  Tele/VS/O2: Slightly elevated BP, other VSS on RA  ABNL Lab/BG: MRI  Diet: Reg  Bowel/Bladder: Continent B&B, ambulating to the BR.  Skin Concerns: None  Drains/Devices: PIV s/l   Tests/Procedures for next shift: None  Anticipated DC date & active delays: 5/11?  Patient Stated Goal for Today: Pain management.

## 2022-05-11 NOTE — PROGRESS NOTES
Observation goals  PRIOR TO DISCHARGE       Comments:   -diagnostic tests and consults completed and resulted:Partially met     -vital signs normal or at patient baseline: met     -adequate pain control on oral analgesics: Not met, continues to need IV pain meds.     -returns to baseline functional status: Not met        Nurse to notify provider when observation goals have been met and patient is ready for discharge.

## 2022-05-11 NOTE — PROGRESS NOTES
MD Notification    Notified Person: NP    Notified Person Name:Rosalind Ruffin ERNIE    Notification Date/Time:5/11/22 @ 1326HRS.     Notification Interaction: Pager    Purpose of Notification: Pt is requesting for Neurosurgery team to come back and discuss more about pt's pain issues. Pt states that LIZANDRO that she received yesterday is not working. Thanks.     Orders Received:    Comments:

## 2022-05-11 NOTE — PROGRESS NOTES
Orientation/Cognitive: A&OX4  Observation Goals (Met/ Not Met): Not met.Neurosurgery following, lami and discectomy?  Mobility Level/Assist Equipment: SBA  Fall Risk (Y/N): Y  Behavior Concerns: None  Pain Management: PRN Dilaudid, oxycodone,scheduled Tylenol and lidocaine patch  Tele/VS/O2: Slightly elevated BP, other VSS on RA  ABNL Lab/BG: MRI  Diet: Reg  Bowel/Bladder: Continent B&B, ambulating to the BR.  Skin Concerns: None  Drains/Devices: PIV s/l   Tests/Procedures for next shift: None  Anticipated DC date & active delays: TBD  Patient Stated Goal for Today: Pain management.

## 2022-05-11 NOTE — PROGRESS NOTES
Observation goals  PRIOR TO DISCHARGE       Comments:   -diagnostic tests and consults completed and resulted:Partially met     -vital signs normal or at patient baseline: Met     -adequate pain control on oral analgesics: Partially met, continues to need IV pain meds.      -returns to baseline functional status: Not met        Nurse to notify provider when observation goals have been met and patient is ready for discharge.

## 2022-05-11 NOTE — PROGRESS NOTES
"   05/11/22 1000   Quick Adds   Type of Visit Initial PT Evaluation       Present no   Living Environment   People in Home spouse;child(ama), dependent   Current Living Arrangements house   Home Accessibility stairs to enter home   Number of Stairs, Main Entrance 4   Stair Railings, Main Entrance railing on left side (ascending)   Transportation Anticipated family or friend will provide   Living Environment Comments Pt reports living with her spouse and children in a house. Pt reports needing to negotiate stairs to enter the home but once inside, all needs met on the main floor. Pt reports her spouse will pick her up upon discharge. Pt will have 24/7 assist as needed upon discharge.   Self-Care   Usual Activity Tolerance good   Current Activity Tolerance fair   Equipment Currently Used at Home none   Fall history within last six months no   Activity/Exercise/Self-Care Comment Pt reports being IND at baseline with all ADLs. Pt reports ambulating at baseline without an AD and does not own one. Pt reports stairs are difficult for her due to back pain. Pt reports being able to ambulate unlimited distances without an AD. Pt drives.   General Information   Onset of Illness/Injury or Date of Surgery 05/11/22   Referring Physician Emeka Swan MD   Patient/Family Therapy Goals Statement (PT) \"To get rid of my pain\"   Pertinent History of Current Problem (include personal factors and/or comorbidities that impact the POC) Per Chart: Haylee Couch is a 41 year old female who presents with back pain   Existing Precautions/Restrictions fall   Weight-Bearing Status - LLE full weight-bearing   Weight-Bearing Status - RLE full weight-bearing   General Observations Pt reports only position that provides some pain relief is laying on R side.   Cognition   Orientation Status (Cognition) oriented x 4   Pain Assessment   Patient Currently in Pain Yes, see Vital Sign flowsheet  (9/10 in L LE from hip " down to foot)   Integumentary/Edema   Integumentary/Edema no deficits were identifed   Posture    Posture Forward head position;Protracted shoulders   Range of Motion (ROM)   Range of Motion ROM is WFL   Strength (Manual Muscle Testing)   Strength (Manual Muscle Testing) strength is WFL   Bed Mobility   Comment, (Bed Mobility) Supine>sit w/ IND   Transfers   Comment, (Transfers) Sit>stand w/o AD and SBA   Gait/Stairs (Locomotion)   Nichols Level (Gait) contact guard   Assistive Device (Gait) other (see comments)  (no AD)   Distance in Feet (Required for LE Total Joints) 10'   Comment, (Gait/Stairs) Pt ambulated ~10' w/o AD and CGA. Pt was steady throughout but unable to ambulate further citing increased pain.   Balance   Balance no deficits were identified   Sensory Examination   Sensory Perception Comments Pt reports numbness and tingling in L thigh down to ankle   Clinical Impression   Criteria for Skilled Therapeutic Intervention Yes, treatment indicated   PT Diagnosis (PT) Impaired gait   Influenced by the following impairments Increased pain; decreased activity tolerance   Functional limitations due to impairments Impaired functional mobility   Clinical Presentation (PT Evaluation Complexity) Stable/Uncomplicated   Clinical Presentation Rationale Clinical judgement   Clinical Decision Making (Complexity) low complexity   Planned Therapy Interventions (PT) balance training;bed mobility training;gait training;patient/family education;strengthening;stair training;transfer training   Risk & Benefits of therapy have been explained evaluation/treatment results reviewed;care plan/treatment goals reviewed;risks/benefits reviewed;current/potential barriers reviewed;participants voiced agreement with care plan;participants included;patient   PT Discharge Planning   PT Discharge Recommendation (DC Rec) home with assist   PT Rationale for DC Rec Pt is below baseline. Pt's main limitation is pain control at this time.  Unable to ambulate >10' at this time due to increased pain radiating down LLE. Anticipate once pain control is improved, pt will be SBA for bed mobility, functional transfers, gait and stairs. Pt will have 24/7 assist as needed upon discharge.   PT Brief overview of current status Supine>sit w/ IND; sit>stand w/o AD and SBA; gait w/o AD and CGA   Plan of Care Review   Plan of Care Reviewed With patient   Total Evaluation Time   Total Evaluation Time (Minutes) 10   Physical Therapy Goals   PT Frequency Daily   PT Predicted Duration/Target Date for Goal Attainment 05/18/22   PT Goals Bed Mobility;Transfers;Gait;Stairs   PT: Bed Mobility Supervision/stand-by assist;Supine to/from sit   PT: Transfers Supervision/stand-by assist;Sit to/from stand   PT: Gait Supervision/stand-by assist;Assistive device;100 feet   PT: Stairs Minimal assist;4 stairs;Rail on left

## 2022-05-11 NOTE — PROGRESS NOTES
Observation goals  PRIOR TO DISCHARGE       Comments:   -diagnostic tests and consults completed and resulted:Partially met     -vital signs normal or at patient baseline: Partially met, slightly elevated BP.     -adequate pain control on oral analgesics: Partially met, still c/o pain, Neurosurgery contacted.      -returns to baseline functional status: Not met        Nurse to notify provider when observation goals have been met and patient is ready for discharge.

## 2022-05-11 NOTE — UTILIZATION REVIEW
"[facility]     Admission Status; Secondary Review Determination     Admission Date: 5/9/2022  8:08 PM      Under the authority of the Utilization Management Committee, the utilization review process indicated a secondary review on the above patient.  The review outcome is based on review of the medical records, discussions with staff, and applying clinical experience noted on the date of the review.          (x) Observation Status Appropriate - This patient does not meet hospital inpatient criteria and is placed in observation status. If this patient's primary payer is Medicare and was admitted as an inpatient, Condition Code 44 should be used and patient status changed to \"observation\".     RATIONALE FOR DETERMINATION   41-year-old female was admitted on 5/9 with low back pain and left leg pain.  MRI showed an L4-L5 disc extrusion with spinal canal stenosis and severe left lateral recess stenosis as well as L5-S1 moderate left lateral recess stenosis.  She underwent lumbar LIZANDRO yesterday.  She has had some improvement but still complains of pain.  Multiple analgesics have been initiated and are being titrated.  Neurosurgery has been consulted.  Patient would like to discuss potential surgical observations with the neurosurgery team but at this time there are no definitive surgical plans.  At the time of this review the patient does not meet medical necessity for inpatient hospitalization and observation status remains appropriate.  However, inpatient status could be considered in the event that operative intervention is planned during this hospitalization.    The severity of illness, intensity of service provided, expected LOS and risk for adverse outcome make the care appropriate for further observation; however, doesn't meet criteria for hospital inpatient admission.        The information on this document is developed by the utilization review team in order for the business office to ensure " compliance.  This only denotes the appropriateness of proper admission status and does not reflect the quality of care rendered.         The definitions of Inpatient Status and Observation Status used in making the determination above are those provided in the CMS Coverage Manual, Chapter 1 and Chapter 6, section 70.4.      Sincerely,     George Brandon DO MPH   Physician Advisor  Utilization Review  [facility]

## 2022-05-11 NOTE — PROGRESS NOTES
"[facility]    Medicine Progress Note - Hospitalist Service       Date of Admission:  5/9/2022  Assessment & Plan   Halyee Couch is a 41 year-old female with history of obesity who presents with back pain. Admitted 5/9/2022.      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  - Neurosurgery consulted, following  - Completed L4-L5 LIZANDRO 5/10/22 with minimal relief  - Increased oxycodone to 10 mg without effect, will rotate to PO hydromorphone. Continue IV hydromorphone  - Add scheduled naproxen + PPI  - Add scheduled methocarbamol, gabapentin   - PT   - Start scheduled Senna for constipation prophylaxis while on opioids     Morbid obesity  Body mass index is 49.02 kg/m .. Increase in all-cause morbidity and mortality. Encourage weight loss.      COVID-19 negative    Clinically Significant Risk Factors Present on Admission                  # Severe Obesity: Estimated body mass index is 49.02 kg/m  as calculated from the following:    Height as of this encounter: 1.626 m (5' 4\").    Weight as of this encounter: 129.5 kg (285 lb 9.6 oz).         Diet: Regular Diet Adult    DVT Prophylaxis: Observation patient, short stay anticipated   Marino Catheter: Not present  Code Status: Full Code      Disposition Plan   Expected Discharge: 05/12/2022     Anticipated discharge location:  Awaiting care coordination huddle  Delays:          Entered: Miko Huynh MD 05/11/2022, 9:16 AM       The patient's care was discussed with the patient, bedside RN     Miko Huynh MD  Hospitalist Service  [facility]    ______________________________________________________________________    Interval History   No acute events overnight.  Reports minimal pain relief since LIZANDRO.  Continues to have limited positions where she is even remotely comfortable.  Does not feel oxycodone is helping with pain.  Has not had bowel movement. Otherwise denies any new symptoms    Data reviewed today: I reviewed all medications, new labs and imaging results over the last 24 hours. I personally reviewed no images or EKG's today.    Physical Exam   Vital Signs: Temp: 98.1  F (36.7  C) Temp src: Oral BP: (!) 147/90 Pulse: 68   Resp: 18 SpO2: 97 % O2 Device: None (Room air)    Weight: 285 lbs 9.6 oz    Constitutional: NAD  Respiratory: Clear to auscultation bilaterally, good air movement bilaterally  Cardiovascular: RRR. No peripheral edema.  GI: Soft, non-tender, non-distended.    Skin/Integumen: Warm, dry  Other:      Data   Recent Labs   Lab 05/10/22  0625 05/09/22  2108   WBC 8.1 10.0   HGB 14.4 13.8   MCV 87 88    219   NA  --  141   POTASSIUM  --  3.4   CHLORIDE  --  109   CO2  --  25   BUN  --  17   CR  --  0.71   ANIONGAP  --  7   MAYTE  --  8.8   GLC  --  87       Recent Results (from the past 24 hour(s))   XR Lumbar Translaminar Inj Single    Narrative    XR LUMBAR TRANSLAMINAR INJ INCL IMAGING                5/10/2022 3:08  PM       History:  Low back and left leg radiculopathy. Request for L4-5  interlaminar epidural steroid injection.     PROCEDURE:  The procedure, indications, risks (including the risk of  infection, bleeding and reaction to contrast material and  medications), and alternatives to therapy were discussed with the  patient and informed consent was obtained for the procedure.  The  lower back was prepped and draped in the usual fashion.  Lidocaine 1%  was used for local anesthesia.  Using fluoroscopic guidance, a  22-gauge spinal needle was advanced into the epidural space via  interlaminar approach at the  L4-L5 level.  Loss of resistance and a  small amount of contrast was injected confirming epidural location of  the needle.  Subsequently, a mixture of 3 mL Celestone and 3 mL  Lidocaine 1% was injected.  The needle was removed.  Estimated blood  loss during the procedure was less than 5 mL. No specimens collected.  The patient tolerated the procedure well and there were no immediate  complications.        Fluoro time: 0.4 minutes  Images Obtained: 9  Medications: 3 mL 1% lidocaine, 1 ml of Isovue m200, 18 mg of  betamethasone, 3 mL 1% lidocaine.    Patient's pain levels (0-10 scale) are as follows:    PRE INJECTION     Low back                 10     Right leg                  0   Left leg                     10      POST INJECTION   Low back                 3    Right leg                  0    Left leg                     4         Impression    IMPRESSION:  Technically successful lumbar translaminar epidural  steroid injection at L4-5.  Long-term results are pending.     JOHN DAVILA PA-C         SYSTEM ID:  [id number]     Medications       acetaminophen  975 mg Oral Q8H     lidocaine  1 patch Transdermal Q24h    And     lidocaine   Transdermal Q8H

## 2022-05-11 NOTE — PROGRESS NOTES
Orientation/Cognitive: A&OX4    Observation Goals (Met/ Not Met): Not met    Mobility Level/Assist Equipment: SBA    Fall Risk (Y/N): Y    Behavior Concerns: None    Pain Management: PRN Dilaudid,Toradol& scheduled Tylenol and   lidocaine patch    Tele/VS/O2: Slightly elevated BP, other VSS on RA    ABNL Lab/BG: MRI    Diet: Reg    Bowel/Bladder: Continent B&B, ambulating to the BR.    Skin Concerns: None    Drains/Devices: PIV s/l     Tests/Procedures for next shift: None    Anticipated DC date & active delays: 5/11?    Patient Stated Goal for Today: Pain management.       Observation goals  PRIOR TO DISCHARGE       Comments:   -diagnostic tests and consults completed and resulted:Partially met     -vital signs normal or at patient baseline: met     -adequate pain control on oral analgesics: Not met, continues to need IV pain meds.     -returns to baseline functional status: Not met        Nurse to notify provider when observation goals have been met and patient is ready for discharge.

## 2022-05-12 ENCOUNTER — APPOINTMENT (OUTPATIENT)
Dept: PHYSICAL THERAPY | Facility: CLINIC | Age: 42
End: 2022-05-12
Payer: COMMERCIAL

## 2022-05-12 ENCOUNTER — APPOINTMENT (OUTPATIENT)
Dept: ULTRASOUND IMAGING | Facility: CLINIC | Age: 42
End: 2022-05-12
Attending: INTERNAL MEDICINE
Payer: COMMERCIAL

## 2022-05-12 PROCEDURE — 97116 GAIT TRAINING THERAPY: CPT | Mod: GP | Performed by: PHYSICAL THERAPIST

## 2022-05-12 PROCEDURE — 99224 PR SUBSEQUENT OBSERVATION CARE,LEVEL I: CPT | Performed by: INTERNAL MEDICINE

## 2022-05-12 PROCEDURE — G0378 HOSPITAL OBSERVATION PER HR: HCPCS

## 2022-05-12 PROCEDURE — 97530 THERAPEUTIC ACTIVITIES: CPT | Mod: GP | Performed by: PHYSICAL THERAPIST

## 2022-05-12 PROCEDURE — 250N000011 HC RX IP 250 OP 636: Performed by: INTERNAL MEDICINE

## 2022-05-12 PROCEDURE — 250N000013 HC RX MED GY IP 250 OP 250 PS 637: Performed by: INTERNAL MEDICINE

## 2022-05-12 PROCEDURE — 93971 EXTREMITY STUDY: CPT | Mod: LT

## 2022-05-12 PROCEDURE — 96376 TX/PRO/DX INJ SAME DRUG ADON: CPT

## 2022-05-12 RX ADMIN — ACETAMINOPHEN 975 MG: 325 TABLET ORAL at 14:05

## 2022-05-12 RX ADMIN — HYDROMORPHONE HYDROCHLORIDE 4 MG: 2 TABLET ORAL at 08:45

## 2022-05-12 RX ADMIN — GABAPENTIN 300 MG: 300 CAPSULE ORAL at 08:46

## 2022-05-12 RX ADMIN — GABAPENTIN 300 MG: 300 CAPSULE ORAL at 14:05

## 2022-05-12 RX ADMIN — HYDROMORPHONE HYDROCHLORIDE 4 MG: 2 TABLET ORAL at 15:40

## 2022-05-12 RX ADMIN — HYDROMORPHONE HYDROCHLORIDE 4 MG: 2 TABLET ORAL at 23:53

## 2022-05-12 RX ADMIN — LIDOCAINE 1 PATCH: 560 PATCH PERCUTANEOUS; TOPICAL; TRANSDERMAL at 20:12

## 2022-05-12 RX ADMIN — HYDROMORPHONE HYDROCHLORIDE 0.3 MG: 1 INJECTION, SOLUTION INTRAMUSCULAR; INTRAVENOUS; SUBCUTANEOUS at 05:29

## 2022-05-12 RX ADMIN — HYDROMORPHONE HYDROCHLORIDE 4 MG: 2 TABLET ORAL at 20:11

## 2022-05-12 RX ADMIN — ACETAMINOPHEN 975 MG: 325 TABLET ORAL at 06:33

## 2022-05-12 RX ADMIN — METHOCARBAMOL 500 MG: 500 TABLET ORAL at 15:40

## 2022-05-12 RX ADMIN — SENNOSIDES AND DOCUSATE SODIUM 1 TABLET: 50; 8.6 TABLET ORAL at 20:11

## 2022-05-12 RX ADMIN — METHOCARBAMOL 500 MG: 500 TABLET ORAL at 08:46

## 2022-05-12 RX ADMIN — METHOCARBAMOL 500 MG: 500 TABLET ORAL at 11:55

## 2022-05-12 RX ADMIN — ACETAMINOPHEN 975 MG: 325 TABLET ORAL at 22:33

## 2022-05-12 RX ADMIN — NAPROXEN 500 MG: 500 TABLET ORAL at 08:46

## 2022-05-12 RX ADMIN — HYDROMORPHONE HYDROCHLORIDE 4 MG: 2 TABLET ORAL at 11:55

## 2022-05-12 RX ADMIN — PANTOPRAZOLE SODIUM 40 MG: 40 TABLET, DELAYED RELEASE ORAL at 06:33

## 2022-05-12 RX ADMIN — SENNOSIDES AND DOCUSATE SODIUM 1 TABLET: 50; 8.6 TABLET ORAL at 08:46

## 2022-05-12 RX ADMIN — HYDROMORPHONE HYDROCHLORIDE 0.3 MG: 1 INJECTION, SOLUTION INTRAMUSCULAR; INTRAVENOUS; SUBCUTANEOUS at 03:30

## 2022-05-12 RX ADMIN — METHOCARBAMOL 500 MG: 500 TABLET ORAL at 20:11

## 2022-05-12 RX ADMIN — NAPROXEN 500 MG: 500 TABLET ORAL at 19:09

## 2022-05-12 RX ADMIN — GABAPENTIN 300 MG: 300 CAPSULE ORAL at 20:11

## 2022-05-12 NOTE — PROGRESS NOTES
Observation goals  PRIOR TO DISCHARGE       Comments:   -diagnostic tests and consults completed and resulted- Partially met    -vital signs normal or at patient baseline- Met    -adequate pain control on oral analgesics- Partially met    -returns to baseline functional status- Not met

## 2022-05-12 NOTE — PLAN OF CARE
Goal Outcome Evaluation:  Orientation/Cognitive: A&O X4  Observation Goals (Met/ Not Met): Partially met   Mobility Level/Assist Equipment: SBA  Fall Risk (Y/N): Y  Behavior Concerns: None   Pain Management: Pain managed with scheduled tylenol, Lidocaine patch and PRN dilaudid    Tele/VS/O2: VSS on RA   ABNL Lab/BG: Abnormal lumbar spine MRI   Diet: Regular   Bowel/Bladder: Continent   Skin Concerns: None   Drains/Devices: PIV SL   Tests/Procedures for next shift: None   Anticipated DC date & active delays: 1-2 days   Patient Stated Goal for Today: Pain management     Observation goals  PRIOR TO DISCHARGE       Comments:   -diagnostic tests and consults completed and resulted- Partially met    -vital signs normal or at patient baseline- Met    -adequate pain control on oral analgesics- Partially met    -returns to baseline functional status- Not met

## 2022-05-12 NOTE — PROGRESS NOTES
Obs goals:   Diagnostic tests and consults completed and resulted MET  Vital signs normal or at patient baseline MET  Adequate pain control on oral analgesics In progress  Returns to baseline functional status NOT MET    Nurse to notify provider when observation goals have been met and patient is ready for discharge.

## 2022-05-12 NOTE — PROGRESS NOTES
[facility]    Neurosurgery  Daily Note    Assessment & Plan   41 yr old female with left leg pain, now a little improved day to day following lumbar LIZANDRO on 5/10.   US was neg for DVT.   Discussed with her that the steroid can take up to a week to take full effect, and that it is encouraging that she has noticed some improvement.       Plan:  -Advance activity as tolerated  -Continue supportive and symptomatic treatment  -if pain improves enough, then she can discharge and plan to see Dr. Escoto in the office as outpatient.     Van Hubbard PA-C    Interval History   Stable.  Doing well.  Improving slowly.  Pain is reasonably controlled.  No fevers.     Physical Exam   Temp: 97.8  F (36.6  C) Temp src: Oral BP: 131/76 Pulse: 81   Resp: 18 SpO2: 95 % O2 Device: None (Room air)    Vitals:    05/09/22 1640 05/09/22 2255   Weight: 129.7 kg (286 lb) 129.5 kg (285 lb 9.6 oz)     Vital Signs with Ranges  Temp:  [97.8  F (36.6  C)-98.7  F (37.1  C)] 97.8  F (36.6  C)  Pulse:  [72-98] 81  Resp:  [18] 18  BP: (129-142)/(71-91) 131/76  SpO2:  [95 %-97 %] 95 %  I/O last 3 completed shifts:  In: 1060 [P.O.:1060]  Out: -     Alert and oriented.  Moves all extremities equally.        Medications        acetaminophen  975 mg Oral Q8H     gabapentin  300 mg Oral TID     lidocaine  1 patch Transdermal Q24h    And     lidocaine   Transdermal Q8H     methocarbamol  500 mg Oral 4x Daily     naproxen  500 mg Oral BID w/meals     pantoprazole  40 mg Oral QAM AC     senna-docusate  1 tablet Oral BID           Van Hubbard PA-C  [facility]  [address]  [address]  [place] [address]    Tel [phone]  Pager [phone]

## 2022-05-12 NOTE — PLAN OF CARE
Goal Outcome Evaluation:    Plan of Care Reviewed With: patient     A&Ox 4. VSS on RA. IV SL. Lung sounds Clear. Bowel sounds active, no bowel movement in a couple of days, fluids and movement encouraged, scheduled bowel meds given. Up SBA. C/O pain in left leg, back pain improved, pain managed with PO dilaudid and scheduled meds. Regular diet.  Plan for pain control.

## 2022-05-12 NOTE — PROGRESS NOTES
Obs goals:   Diagnostic tests and consults completed and resulted NOT MET, ultrasound to be completed   Vital signs normal or at patient baseline MET  Adequate pain control on oral analgesics In progress  Returns to baseline functional status NOT MET    Nurse to notify provider when observation goals have been met and patient is ready for discharge.

## 2022-05-12 NOTE — PROGRESS NOTES
"[facility]    Medicine Progress Note - Hospitalist Service       Date of Admission:  5/9/2022  Assessment & Plan   Haylee Couch is a 41 year-old female with history of obesity who presents with back pain. Admitted 5/9/2022.    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  *L4-L5 LIZANDRO completed 5/10/22   - Neurosurgery consulted, following   - Continue hydromorphone PO. IV available for severe breakthrough pain.   - Continue scheduled naproxen with PPI prophylaxis  - Continue scheduled methocarbamol, gabapentin  - PT  - Continue scheduled Senna for constipation prophylaxis while on opioids     Left calf pain/tightness  Possibly due to radiculopathy but is obese, on OCP and has had recent immobility.  - LLE US for DVT    Morbid obesity  Body mass index is 49.02 kg/m .. Increase in all-cause morbidity and mortality. Encourage weight loss.      COVID-19 negative    Clinically Significant Risk Factors Present on Admission                  # Severe Obesity: Estimated body mass index is 49.02 kg/m  as calculated from the following:    Height as of this encounter: 1.626 m (5' 4\").    Weight as of this encounter: 129.5 kg (285 lb 9.6 oz).         Diet: Regular Diet Adult    DVT Prophylaxis: Observation patient   Marino Catheter: Not present  Code Status: Full Code      Disposition Plan   Expected Discharge: 05/12/2022     Anticipated discharge location:  Awaiting care coordination huddle  Delays:          Entered: Miko Huynh MD 05/12/2022, 9:01 AM       The patient's care was discussed with the patient, bedside RN     Miko Huynh MD  Hospitalist Service  " [facility]    ______________________________________________________________________    Interval History   No acute events overnight. Reports pain has improved some overnight, though still required IV doses of hydromorphone. Reports some calf tightness today that is new. Has not had a BM yet. Denies any other new symptoms.      Data reviewed today: I reviewed all medications, new labs and imaging results over the last 24 hours. I personally reviewed no images or EKG's today.    Physical Exam   Vital Signs: Temp: 97.8  F (36.6  C) Temp src: Oral BP: 131/76 Pulse: 81   Resp: 18 SpO2: 95 % O2 Device: None (Room air)    Weight: 285 lbs 9.6 oz    Constitutional: NAD  Respiratory: Clear to auscultation bilaterally, good air movement bilaterally  Cardiovascular: RRR. No peripheral edema.  GI: Soft, non-tender, non-distended.    Skin/Integumen: Warm, dry  Other:      Data   Recent Labs   Lab 05/10/22  0625 05/09/22  2108   WBC 8.1 10.0   HGB 14.4 13.8   MCV 87 88    219   NA  --  141   POTASSIUM  --  3.4   CHLORIDE  --  109   CO2  --  25   BUN  --  17   CR  --  0.71   ANIONGAP  --  7   MAYTE  --  8.8   GLC  --  87       No results found for this or any previous visit (from the past 24 hour(s)).  Medications       acetaminophen  975 mg Oral Q8H     gabapentin  300 mg Oral TID     lidocaine  1 patch Transdermal Q24h    And     lidocaine   Transdermal Q8H     methocarbamol  500 mg Oral 4x Daily     naproxen  500 mg Oral BID w/meals     pantoprazole  40 mg Oral QAM AC     senna-docusate  1 tablet Oral BID

## 2022-05-13 ENCOUNTER — APPOINTMENT (OUTPATIENT)
Dept: PHYSICAL THERAPY | Facility: CLINIC | Age: 42
End: 2022-05-13
Payer: COMMERCIAL

## 2022-05-13 VITALS
SYSTOLIC BLOOD PRESSURE: 126 MMHG | WEIGHT: 285.6 LBS | HEART RATE: 92 BPM | BODY MASS INDEX: 48.76 KG/M2 | RESPIRATION RATE: 18 BRPM | DIASTOLIC BLOOD PRESSURE: 86 MMHG | TEMPERATURE: 97.9 F | OXYGEN SATURATION: 95 % | HEIGHT: 64 IN

## 2022-05-13 PROCEDURE — 97116 GAIT TRAINING THERAPY: CPT | Mod: GP | Performed by: PHYSICAL THERAPIST

## 2022-05-13 PROCEDURE — 250N000013 HC RX MED GY IP 250 OP 250 PS 637: Performed by: INTERNAL MEDICINE

## 2022-05-13 PROCEDURE — 99217 PR OBSERVATION CARE DISCHARGE: CPT | Performed by: INTERNAL MEDICINE

## 2022-05-13 PROCEDURE — G0378 HOSPITAL OBSERVATION PER HR: HCPCS

## 2022-05-13 PROCEDURE — 97530 THERAPEUTIC ACTIVITIES: CPT | Mod: GP | Performed by: PHYSICAL THERAPIST

## 2022-05-13 RX ORDER — AMOXICILLIN 250 MG
1 CAPSULE ORAL 2 TIMES DAILY
Qty: 60 TABLET | Refills: 0 | Status: ON HOLD | OUTPATIENT
Start: 2022-05-13 | End: 2022-06-10

## 2022-05-13 RX ORDER — HYDROMORPHONE HYDROCHLORIDE 4 MG/1
4 TABLET ORAL
Qty: 30 TABLET | Refills: 0 | Status: ON HOLD | OUTPATIENT
Start: 2022-05-13 | End: 2022-06-10

## 2022-05-13 RX ORDER — GABAPENTIN 300 MG/1
300 CAPSULE ORAL 3 TIMES DAILY
Qty: 90 CAPSULE | Refills: 0 | Status: SHIPPED | OUTPATIENT
Start: 2022-05-13 | End: 2022-06-12

## 2022-05-13 RX ORDER — NAPROXEN 500 MG/1
500 TABLET ORAL 2 TIMES DAILY WITH MEALS
Start: 2022-05-13

## 2022-05-13 RX ORDER — METHOCARBAMOL 500 MG/1
500 TABLET, FILM COATED ORAL 4 TIMES DAILY
Qty: 28 TABLET | Refills: 0 | Status: SHIPPED | OUTPATIENT
Start: 2022-05-13 | End: 2022-05-20

## 2022-05-13 RX ORDER — PANTOPRAZOLE SODIUM 40 MG/1
40 TABLET, DELAYED RELEASE ORAL
Qty: 30 TABLET | Refills: 0 | Status: SHIPPED | OUTPATIENT
Start: 2022-05-13 | End: 2022-06-12

## 2022-05-13 RX ADMIN — HYDROMORPHONE HYDROCHLORIDE 4 MG: 2 TABLET ORAL at 06:03

## 2022-05-13 RX ADMIN — METHOCARBAMOL 500 MG: 500 TABLET ORAL at 12:21

## 2022-05-13 RX ADMIN — HYDROMORPHONE HYDROCHLORIDE 4 MG: 2 TABLET ORAL at 09:29

## 2022-05-13 RX ADMIN — GABAPENTIN 300 MG: 300 CAPSULE ORAL at 08:44

## 2022-05-13 RX ADMIN — GABAPENTIN 300 MG: 300 CAPSULE ORAL at 13:36

## 2022-05-13 RX ADMIN — HYDROMORPHONE HYDROCHLORIDE 4 MG: 2 TABLET ORAL at 12:22

## 2022-05-13 RX ADMIN — SENNOSIDES AND DOCUSATE SODIUM 1 TABLET: 50; 8.6 TABLET ORAL at 08:44

## 2022-05-13 RX ADMIN — METHOCARBAMOL 500 MG: 500 TABLET ORAL at 08:44

## 2022-05-13 RX ADMIN — NAPROXEN 500 MG: 500 TABLET ORAL at 08:44

## 2022-05-13 RX ADMIN — ACETAMINOPHEN 975 MG: 325 TABLET ORAL at 06:02

## 2022-05-13 RX ADMIN — PANTOPRAZOLE SODIUM 40 MG: 40 TABLET, DELAYED RELEASE ORAL at 06:03

## 2022-05-13 NOTE — PROGRESS NOTES
"[facility]    Neurosurgery Progress Note    Date of Service (when I saw the patient): 05/13/2022     Assessment & Plan   41 yr old female with left leg pain. Imaging revealed disc extrusion at L4-5 on the left as well as a disc bulge to the left at L5-S1. On 5/10/2022 she underwent a L4-5 translaminar LIZANDRO. She continues to see improvement in her left-sided low back and left leg pain. No paresthesias or weakness. Has been working with PT and was able to do stairs today.    Plan:  -Continues to improve with LIZANDRO.   -OK for discharge from [facility] perspective with follow up in clinic with Dr. Escoto      I have discussed the following assessment and plan Dr. Escoto who is in agreement with initial plan and will follow up with further consultation recommendations.    Danielle Madrigal, DIMA  [facility]  [facility]  [address] [place] [address]  [address] [place] [address]    Tel [phone]  Pager [phone]      Interval History   Stable. Improving slowly.    Physical Exam   Temp: 98.1  F (36.7  C) Temp src: Oral BP: 122/77 Pulse: 71   Resp: 16 SpO2: 97 % O2 Device: None (Room air)    Vitals:    05/09/22 1640 05/09/22 2255   Weight: 286 lb (129.7 kg) 285 lb 9.6 oz (129.5 kg)     Vital Signs with Ranges  Temp:  [97.4  F (36.3  C)-98.5  F (36.9  C)] 98.1  F (36.7  C)  Pulse:  [61-87] 71  Resp:  [16-18] 16  BP: (114-133)/(68-79) 122/77  SpO2:  [95 %-97 %] 97 %  I/O last 3 completed shifts:  In: 780 [P.O.:780]  Out: -      , Blood pressure 122/77, pulse 71, temperature 98.1  F (36.7  C), temperature source Oral, resp. rate 16, height 5' 4\" (1.626 m), weight 285 lb 9.6 oz (129.5 kg), SpO2 97 %.  285 lbs 9.6 oz  HEENT:  Normocephalic.  PERRLA.   Heart:  No peripheral edema  Lungs:  No SOB  Skin:  Warm and dry, good capillary refill.  Extremities:  Good radial and dorsalis pedis pulses bilaterally, no edema, cyanosis or clubbing.    NEUROLOGICAL EXAMINATION:   Mental " status:  Alert and Oriented x 3, speech is fluent.  Motor:  Strength is 5/5 throughout the upper and lower extremities  Sensation:  intact  Reflexes:  Negative Babinski.  Negative Clonus.      Medications       acetaminophen  975 mg Oral Q8H     gabapentin  300 mg Oral TID     lidocaine  1 patch Transdermal Q24h    And     lidocaine   Transdermal Q8H     methocarbamol  500 mg Oral 4x Daily     naproxen  500 mg Oral BID w/meals     pantoprazole  40 mg Oral QAM AC     senna-docusate  1 tablet Oral BID       Data     CBC RESULTS:   Recent Labs   Lab Test 05/10/22  0625   WBC 8.1   RBC 4.90   HGB 14.4   HCT 42.7   MCV 87   MCH 29.4   MCHC 33.7   RDW 12.1        Basic Metabolic Panel:  Lab Results   Component Value Date     05/09/2022     06/03/2006      Lab Results   Component Value Date    POTASSIUM 3.4 05/09/2022    POTASSIUM 3.5 06/03/2006     Lab Results   Component Value Date    CHLORIDE 109 05/09/2022    CHLORIDE 106 06/03/2006     Lab Results   Component Value Date    MAYTE 8.8 05/09/2022    MAYTE 9.0 06/03/2006     Lab Results   Component Value Date    CO2 25 05/09/2022    CO2 27 06/03/2006     Lab Results   Component Value Date    BUN 17 05/09/2022    BUN 18 06/03/2006     Lab Results   Component Value Date    CR 0.71 05/09/2022    CR 1.00 06/03/2006     Lab Results   Component Value Date    GLC 87 05/09/2022     06/03/2006     INR:  No results found for: INR

## 2022-05-13 NOTE — PLAN OF CARE
Physical Therapy Discharge Summary    Reason for therapy discharge:    Discharged to home.    Progress towards therapy goal(s). See goals on Care Plan in [facility] electronic health record for goal details.  Goals partially met.  Barriers to achieving goals:   discharge from facility.    Therapy recommendation(s):    Continue home exercise program.

## 2022-05-13 NOTE — PROGRESS NOTES
Observation goals  PRIOR TO DISCHARGE       Comments:   -diagnostic tests and consults completed and resulted: met   -vital signs normal or at patient baseline: met  -adequate pain control on oral analgesics: partially met  -returns to baseline functional status: partially met   Nurse to notify provider when observation goals have been met and patient is ready for discharge.

## 2022-05-13 NOTE — PLAN OF CARE
Goal Outcome Evaluation:  Orientation/Cognitive: A&Ox4, very pleasant   Observation Goals (Met/ Not Met): Not met, pain control  Mobility Level/Assist Equipment: Ind/ SBA  Fall Risk (Y/N): no  Behavior Concerns: none  Pain Management: PO dilaudid given overnight, lidocaine patch removed for shower (retimed for the AM to be reapplied), scheduled tylenol and Scheduled robaxin  Tele/VS/O2: VSS on RA  ABNL Lab/BG: none  Diet: reg  Bowel/Bladder: continent  Skin Concerns: none  Drains/Devices: PIV SL  Tests/Procedures for next shift: PT working with pt  Anticipated DC date & active delays: pending today with follow up outpatient with neurosurgery   Patient Stated Goal for Today: Shower (done overnight), pain control and rest    Observation goals  PRIOR TO DISCHARGE       Comments:   -diagnostic tests and consults completed and resulted: met   -vital signs normal or at patient baseline: met  -adequate pain control on oral analgesics: partially met  -returns to baseline functional status: partially met   Nurse to notify provider when observation goals have been met and patient is ready for discharge.

## 2022-05-13 NOTE — CARE PLAN
Shift: [id number] 5/12/22  Orientation/Cognitive: A/Ox4  Observation Goals (Met/ Not Met): Not Met  Mobility Level/Assist Equipment: SBA, ambulates in room; steady gait    Fall Risk (Y/N): No  Behavior Concerns: none  Pain Management: Scheduled tylenol, robaxin, and lidocaine patch and PRN dilaudid   Tele/VS/O2: VSS on room air   ABNL Lab/BG: none  Diet: Regular   Bowel/Bladder: continent of B/B  Skin Concerns: none  Drains/Devices: PIV SL  Tests/Procedures for next shift: PT following   Anticipated DC date & active delays: possible discharge tomorrow   Patient Stated Goal for Today: pain control

## 2022-05-13 NOTE — PLAN OF CARE
Orientation/Cognitive: A&Ox4  Observation Goals (Met/ Not Met): not met  Mobility Level/Assist Equipment: Ind  Fall Risk (Y/N): no  Behavior Concerns: none  Pain Management: robaxin, naproxen and PO dilaudid  Tele/VS/O2: VSS. RA.   ABNL Lab/BG: NA  Diet: regular- tolerating  Bowel/Bladder: continent  Skin Concerns: NA  Drains/Devices: PIV SL  Tests/Procedures for next shift: none  Anticipated DC date & active delays: today  Patient Stated Goal for Today: discharge      Discharge instructions and meds given to patient. Discharge home with family.

## 2022-05-13 NOTE — DISCHARGE SUMMARY
[facility]  Discharge Summary        Haylee Couch MRN# [id number]   YOB: 1980 Age: 41 year old     Date of Admission:  5/9/2022  Date of Discharge:  5/13/2022  Admitting Physician:  Emeka Swan MD  Discharge Physician: Timbo Moses MD  Discharging Service: Hospitalist     Primary Provider:  [facility], [place]  Primary Care Physician Phone Number: [phone]         Discharge Diagnoses/Problem Oriented Hospital Course (Providers):    Haylee Couch was admitted on 5/9/2022 by Emeka Swan MD and I would refer you to their history and physical.  The following problems were addressed during her hospitalization:    Haylee Couch is a 41 year-old female with history of obesity who presents with back pain. Admitted 5/9/2022.     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  *L4-L5 LIZANDRO completed 5/10/22   - Neurosurgery consulted, following   - Continue hydromorphone PO- Continue scheduled naproxen with PPI prophylaxis  - Continue scheduled methocarbamol, gabapentin  - Continue scheduled Senna for constipation prophylaxis while on opioids      Left calf pain/tightness  Possibly due to radiculopathy but is obese, on OCP and has had recent immobility.  - LLE US for DVT negative     Morbid obesity  Body mass index is 49.02 kg/m .. Increase in all-cause morbidity and mortality. Encourage weight loss.      COVID-19 negative    Clinically Significant Risk Factors Present on Admission               # Severe Obesity: Estimated body mass index is 49.02 kg/m  as calculated from the following:    Height as of this encounter: 1.626 m  "(5' 4\").    Weight as of this encounter: 129.5 kg (285 lb 9.6 oz).     Diet: Regular Diet Adult    DVT Prophylaxis: Observation patient   Marino Catheter: Not present  Code Status: Full Code          Disposition Plan     Expected Discharge: 05/13/2022             Code Status:      Full Code         Important Results:      See below         Pending Results:        Unresulted Labs Ordered in the Past 30 Days of this Admission     No orders found from 4/9/2022 to 5/10/2022.               Discharge Instructions and Follow-Up:      Follow-up Appointments     Follow-up and recommended labs and tests       Please follow up with Dr. Escoto at [facility].    Please call the clinic at [phone] to schedule your appointment.         Follow-up and recommended labs and tests       [place] [facility] [place] Disposition:      Discharged to home         Discharge Medications:        Current Discharge Medication List      START taking these medications    Details   HYDROmorphone (DILAUDID) 4 MG tablet Take 1 tablet (4 mg) by mouth every 3 hours as needed for moderate to severe pain  Qty: 30 tablet, Refills: 0    Associated Diagnoses: Lumbar back pain with radiculopathy affecting left lower extremity      methocarbamol (ROBAXIN) 500 MG tablet Take 1 tablet (500 mg) by mouth 4 times daily for 7 days  Qty: 28 tablet, Refills: 0    Associated Diagnoses: Lumbar back pain with radiculopathy affecting left lower extremity      naproxen (NAPROSYN) 500 MG tablet Take 1 tablet (500 mg) by mouth 2 times daily (with meals)    Associated Diagnoses: Lumbar back pain with radiculopathy affecting left lower extremity      pantoprazole (PROTONIX) 40 MG EC tablet Take 1 tablet (40 mg) by mouth every morning (before breakfast)  Qty: 30 tablet, Refills: 0    Associated Diagnoses: Lumbar back pain with radiculopathy affecting left lower extremity      senna-docusate " (SENOKOT-S/PERICOLACE) 8.6-50 MG tablet Take 1 tablet by mouth 2 times daily  Qty: 60 tablet, Refills: 0    Associated Diagnoses: Lumbar back pain with radiculopathy affecting left lower extremity         CONTINUE these medications which have CHANGED    Details   gabapentin (NEURONTIN) 300 MG capsule Take 1 capsule (300 mg) by mouth 3 times daily  Qty: 90 capsule, Refills: 0    Associated Diagnoses: Lumbar back pain with radiculopathy affecting left lower extremity         CONTINUE these medications which have NOT CHANGED    Details   ibuprofen (ADVIL/MOTRIN) 200 MG tablet Take 400-800 mg by mouth every 8 hours as needed for mild pain      levonorgestrel-ethinyl estradiol (KURVELO) 0.15-30 MG-MCG tablet Take 1 tablet by mouth daily      multivitamin w/minerals (THERA-VIT-M) tablet Take 1 tablet by mouth daily      traMADol (ULTRAM) 50 MG tablet Take  mg by mouth every 8 hours as needed for severe pain         STOP taking these medications       cyclobenzaprine (FLEXERIL) 5 MG tablet Comments:   Reason for Stopping:         predniSONE (DELTASONE) 20 MG tablet Comments:   Reason for Stopping:                    Allergies:       No Known Allergies         Consultations This Hospital Stay:      Consultation during this admission received from neurosurgery          Discharge Orders       After Care Instructions     Activity      Your activity upon discharge: activity as tolerated         Diet      Follow this diet upon discharge: Orders Placed This Encounter      Regular Diet Adult                      Discharge Time:      Less than 30 minutes.        Image Results From This Hospital Stay (For Non-EPIC Providers):        Results for orders placed or performed during the hospital encounter of 05/09/22   Lumbar spine MRI w/o contrast    Narrative    EXAM: MR LUMBAR SPINE W/O CONTRAST  LOCATION: [facility]  DATE/TIME: 5/10/2022 1:48 AM    INDICATION: Left radiculopathy. Severe left low back  pain. Pain and tingling down left leg.  COMPARISON: None.  TECHNIQUE: Routine Lumbar Spine MRI without IV contrast.    FINDINGS:   Nomenclature is based on 5 lumbar type vertebral bodies. Normal vertebral body heights. Straightening of the normal lumbar lordosis. A few scattered appearing subcentimeter osseous hemangiomas are present involving the L1, L4 and L5 vertebral bodies. No   suspicious marrow edema identified. Normal distal spinal cord. The conus terminates at L1. Normal cauda equina nerve roots. No extraspinal abnormality. Unremarkable visualized bony pelvis.    T12-L1: Normal disc height and signal. No disc herniation. No canal or foraminal stenosis.     L1-L2: Normal disc height and signal. No disc herniation. Bilateral facet arthropathy. No significant canal or foraminal stenosis.    L2-L3: Normal disc height and signal. Bilateral facet arthropathy. No significant disc herniation. No canal or foraminal stenosis.     L3-L4: Disc desiccation. Minimal disc height loss. Bilateral facet arthropathy. Mild ligamentum flavum thickening. Trace concentric disc bulge. Prominence of the dorsal epidural fat. Mild to moderate central spinal canal stenosis. No foraminal stenosis.    L4-L5: Disc desiccation. Disc height loss. Concentric disc bulge with a superimposed left lateral recess disc extrusion. Bilateral facet arthropathy. Trace fluid in the left facet joint. Moderate to severe central spinal canal stenosis with severe left   lateral recess stenosis and contact of the descending left-sided cauda equina nerve roots. Mild prominence of the dorsal epidural fat. No significant right neural foraminal stenosis. No significant left neural foraminal stenosis.    L5-S1: Disc desiccation. Disc height loss. Concentric disc bulge, eccentric to the left laterally. There are superimposed midline and left lateral recess disc protrusions at this level. There is a high intensity zone/annular tear involving the midline   disc  protrusion. At this level there is minimal central spinal canal stenosis with moderate left lateral recess stenosis and apparent contact of the descending left S1 cauda equina nerve root (image 49 series 9, image 9 series 10 and image 13 series 6).   There is no significant neural foraminal stenosis.      Impression    IMPRESSION:  1.  Multilevel degenerative changes of lumbar spine, as above.  2.  At L4-L5, there is a concentric disc bulge with a superimposed left lateral recess disc extrusion. At this level, the disc extrusion contacts the descending left-sided cauda equina nerve roots. There is moderate to severe central spinal canal   stenosis with severe left lateral recess stenosis at this level.  3.  At L5-S1, there is a concentric disc bulge, eccentric to the left laterally. There are superimposed midline and left lateral recess disc protrusions with moderate left lateral recess stenosis and apparent contact of the descending left S1 cauda   equina nerve root. Overall, there is minimal central spinal canal stenosis without significant neural foraminal stenosis at this level.  4.  Additional findings above.   XR Lumbar Translaminar Inj Single    Narrative    XR LUMBAR TRANSLAMINAR INJ INCL IMAGING                5/10/2022 3:08  PM       History:  Low back and left leg radiculopathy. Request for L4-5  interlaminar epidural steroid injection.     PROCEDURE:  The procedure, indications, risks (including the risk of  infection, bleeding and reaction to contrast material and  medications), and alternatives to therapy were discussed with the  patient and informed consent was obtained for the procedure.  The  lower back was prepped and draped in the usual fashion.  Lidocaine 1%  was used for local anesthesia.  Using fluoroscopic guidance, a  22-gauge spinal needle was advanced into the epidural space via  interlaminar approach at the L4-L5 level.  Loss of resistance and a  small amount of contrast was injected  confirming epidural location of  the needle.  Subsequently, a mixture of 3 mL Celestone and 3 mL  Lidocaine 1% was injected.  The needle was removed.  Estimated blood  loss during the procedure was less than 5 mL. No specimens collected.  The patient tolerated the procedure well and there were no immediate  complications.        Fluoro time: 0.4 minutes  Images Obtained: 9  Medications: 3 mL 1% lidocaine, 1 ml of Isovue m200, 18 mg of  betamethasone, 3 mL 1% lidocaine.    Patient's pain levels (0-10 scale) are as follows:    PRE INJECTION     Low back                 10     Right leg                  0   Left leg                     10      POST INJECTION   Low back                 3    Right leg                  0    Left leg                     4         Impression    IMPRESSION:  Technically successful lumbar translaminar epidural  steroid injection at L4-5.  Long-term results are pending.     JOHN DAVILA PA-C         SYSTEM ID:  [id number]   US Lower Extremity Venous Duplex Left    Narrative    VENOUS ULTRASOUND LEFT LOWER EXTREMITY  5/12/2022 9:52 AM     HISTORY: Left calf pain, rule out DVT.    COMPARISON: None.    TECHNIQUE:  Color Doppler and spectral waveform analysis performed  throughout the deep veins of the left lower extremity.    FINDINGS: The left common femoral, proximal greater saphenous,  visualized femoral, and popliteal veins demonstrate normal blood flow,  compression, and augmentation. Posterior tibial and peroneal veins are  compressible. Contralateral right common femoral vein is patent.  Difficult to visualize the distal femoral vein given body habitus.      Impression    IMPRESSION: Negative for deep venous thrombosis throughout the  visualized veins of the left lower extremity.     CEE YBARRA MD         SYSTEM ID:  [id number]           Most Recent Lab Results In EPIC (For Non-EPIC Providers):    Most Recent 3 CBC's:  Recent Labs   Lab Test 05/10/22  0625 05/09/22  2108   WBC 8.1 10.0    HGB 14.4 13.8   MCV 87 88    219      Most Recent 3 BMP's:  Recent Labs   Lab Test 05/09/22  2108      POTASSIUM 3.4   CHLORIDE 109   CO2 25   BUN 17   CR 0.71   ANIONGAP 7   MAYTE 8.8   GLC 87     Most Recent 3 Troponin's:No lab results found.    Invalid input(s): TROP, TROPONINIES  Most Recent 3 INR's:No lab results found.  Most Recent 2 LFT's:No lab results found.  Most Recent Cholesterol Panel:No lab results found.  Most Recent 6 Bacteria Isolates From Any Culture (See EPIC Reports for Culture Details):No lab results found.  Most Recent TSH, T4 and HgbA1c:No lab results found.

## 2022-05-13 NOTE — DISCHARGE SUMMARY
[facility]  Discharge Summary        Haylee Couch MRN# [id number]   YOB: 1980 Age: 41 year old     Date of Admission:  5/9/2022  Date of Discharge:  5/13/2022  Admitting Physician:  Emeka Swan MD  Discharge Physician: Timbo Moses MD  Discharging Service: Hospitalist     Primary Provider:  [facility], [place]  Primary Care Physician Phone Number: [phone]         Discharge Diagnoses/Problem Oriented Hospital Course (Providers):    Haylee Couch was admitted on 5/9/2022 by Emeka Swan MD and I would refer you to their history and physical.  The following problems were addressed during her hospitalization:    Haylee Couch is a 41 year-old female with history of obesity who presents with back pain. Admitted 5/9/2022.     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  *L4-L5 LIZANDRO completed 5/10/22   - Neurosurgery consulted, following   - Continue hydromorphone PO- Continue scheduled naproxen with PPI prophylaxis  - Continue scheduled methocarbamol, gabapentin  - Continue scheduled Senna for constipation prophylaxis while on opioids      Left calf pain/tightness  Possibly due to radiculopathy but is obese, on OCP and has had recent immobility.  - LLE US for DVT negative     Morbid obesity  Body mass index is 49.02 kg/m .. Increase in all-cause morbidity and mortality. Encourage weight loss.      COVID-19 negative    Clinically Significant Risk Factors Present on Admission               # Severe Obesity: Estimated body mass index is 49.02 kg/m  as calculated from the following:    Height as of this encounter: 1.626 m  "(5' 4\").    Weight as of this encounter: 129.5 kg (285 lb 9.6 oz).     Diet: Regular Diet Adult    DVT Prophylaxis: Observation patient   Amrino Catheter: Not present  Code Status: Full Code          Disposition Plan     Expected Discharge: 05/13/2022             Code Status:      Full Code         Important Results:      See below         Pending Results:        Unresulted Labs Ordered in the Past 30 Days of this Admission     No orders found from 4/9/2022 to 5/10/2022.               Discharge Instructions and Follow-Up:      Follow-up Appointments     Follow-up and recommended labs and tests       Please follow up with Dr. Escoto at [facility].    Please call the clinic at [phone] to schedule your appointment.         Follow-up and recommended labs and tests       Follow up with Dr. Escoto as directed by [facility]                  Discharge Disposition:      Discharged to home         Discharge Medications:        Current Discharge Medication List      START taking these medications    Details   HYDROmorphone (DILAUDID) 4 MG tablet Take 1 tablet (4 mg) by mouth every 3 hours as needed for moderate to severe pain  Qty: 30 tablet, Refills: 0    Associated Diagnoses: Lumbar back pain with radiculopathy affecting left lower extremity      methocarbamol (ROBAXIN) 500 MG tablet Take 1 tablet (500 mg) by mouth 4 times daily for 7 days  Qty: 28 tablet, Refills: 0    Associated Diagnoses: Lumbar back pain with radiculopathy affecting left lower extremity      naproxen (NAPROSYN) 500 MG tablet Take 1 tablet (500 mg) by mouth 2 times daily (with meals)    Associated Diagnoses: Lumbar back pain with radiculopathy affecting left lower extremity      pantoprazole (PROTONIX) 40 MG EC tablet Take 1 tablet (40 mg) by mouth every morning (before breakfast)  Qty: 30 tablet, Refills: 0    Associated Diagnoses: Lumbar back pain with radiculopathy affecting left lower extremity      senna-docusate " (SENOKOT-S/PERICOLACE) 8.6-50 MG tablet Take 1 tablet by mouth 2 times daily  Qty: 60 tablet, Refills: 0    Associated Diagnoses: Lumbar back pain with radiculopathy affecting left lower extremity         CONTINUE these medications which have CHANGED    Details   gabapentin (NEURONTIN) 300 MG capsule Take 1 capsule (300 mg) by mouth 3 times daily  Qty: 90 capsule, Refills: 0    Associated Diagnoses: Lumbar back pain with radiculopathy affecting left lower extremity         CONTINUE these medications which have NOT CHANGED    Details   levonorgestrel-ethinyl estradiol (KURVELO) 0.15-30 MG-MCG tablet Take 1 tablet by mouth daily      multivitamin w/minerals (THERA-VIT-M) tablet Take 1 tablet by mouth daily      traMADol (ULTRAM) 50 MG tablet Take  mg by mouth every 8 hours as needed for severe pain         STOP taking these medications       cyclobenzaprine (FLEXERIL) 5 MG tablet Comments:   Reason for Stopping:         ibuprofen (ADVIL/MOTRIN) 200 MG tablet Comments:   Reason for Stopping:         predniSONE (DELTASONE) 20 MG tablet Comments:   Reason for Stopping:                    Allergies:       No Known Allergies         Consultations This Hospital Stay:      Consultation during this admission received from neurosurgery          Discharge Orders       After Care Instructions     Activity      Your activity upon discharge: activity as tolerated         Diet      Follow this diet upon discharge: Orders Placed This Encounter      Regular Diet Adult                      Discharge Time:      Less than 30 minutes.        Image Results From This Hospital Stay (For Non-EPIC Providers):        Results for orders placed or performed during the hospital encounter of 05/09/22   Lumbar spine MRI w/o contrast    Narrative    EXAM: MR LUMBAR SPINE W/O CONTRAST  LOCATION: [facility]  DATE/TIME: 5/10/2022 1:48 AM    INDICATION: Left radiculopathy. Severe left low back pain. Pain and tingling down  left leg.  COMPARISON: None.  TECHNIQUE: Routine Lumbar Spine MRI without IV contrast.    FINDINGS:   Nomenclature is based on 5 lumbar type vertebral bodies. Normal vertebral body heights. Straightening of the normal lumbar lordosis. A few scattered appearing subcentimeter osseous hemangiomas are present involving the L1, L4 and L5 vertebral bodies. No   suspicious marrow edema identified. Normal distal spinal cord. The conus terminates at L1. Normal cauda equina nerve roots. No extraspinal abnormality. Unremarkable visualized bony pelvis.    T12-L1: Normal disc height and signal. No disc herniation. No canal or foraminal stenosis.     L1-L2: Normal disc height and signal. No disc herniation. Bilateral facet arthropathy. No significant canal or foraminal stenosis.    L2-L3: Normal disc height and signal. Bilateral facet arthropathy. No significant disc herniation. No canal or foraminal stenosis.     L3-L4: Disc desiccation. Minimal disc height loss. Bilateral facet arthropathy. Mild ligamentum flavum thickening. Trace concentric disc bulge. Prominence of the dorsal epidural fat. Mild to moderate central spinal canal stenosis. No foraminal stenosis.    L4-L5: Disc desiccation. Disc height loss. Concentric disc bulge with a superimposed left lateral recess disc extrusion. Bilateral facet arthropathy. Trace fluid in the left facet joint. Moderate to severe central spinal canal stenosis with severe left   lateral recess stenosis and contact of the descending left-sided cauda equina nerve roots. Mild prominence of the dorsal epidural fat. No significant right neural foraminal stenosis. No significant left neural foraminal stenosis.    L5-S1: Disc desiccation. Disc height loss. Concentric disc bulge, eccentric to the left laterally. There are superimposed midline and left lateral recess disc protrusions at this level. There is a high intensity zone/annular tear involving the midline   disc protrusion. At this level there  is minimal central spinal canal stenosis with moderate left lateral recess stenosis and apparent contact of the descending left S1 cauda equina nerve root (image 49 series 9, image 9 series 10 and image 13 series 6).   There is no significant neural foraminal stenosis.      Impression    IMPRESSION:  1.  Multilevel degenerative changes of lumbar spine, as above.  2.  At L4-L5, there is a concentric disc bulge with a superimposed left lateral recess disc extrusion. At this level, the disc extrusion contacts the descending left-sided cauda equina nerve roots. There is moderate to severe central spinal canal   stenosis with severe left lateral recess stenosis at this level.  3.  At L5-S1, there is a concentric disc bulge, eccentric to the left laterally. There are superimposed midline and left lateral recess disc protrusions with moderate left lateral recess stenosis and apparent contact of the descending left S1 cauda   equina nerve root. Overall, there is minimal central spinal canal stenosis without significant neural foraminal stenosis at this level.  4.  Additional findings above.   XR Lumbar Translaminar Inj Single    Narrative    XR LUMBAR TRANSLAMINAR INJ INCL IMAGING                5/10/2022 3:08  PM       History:  Low back and left leg radiculopathy. Request for L4-5  interlaminar epidural steroid injection.     PROCEDURE:  The procedure, indications, risks (including the risk of  infection, bleeding and reaction to contrast material and  medications), and alternatives to therapy were discussed with the  patient and informed consent was obtained for the procedure.  The  lower back was prepped and draped in the usual fashion.  Lidocaine 1%  was used for local anesthesia.  Using fluoroscopic guidance, a  22-gauge spinal needle was advanced into the epidural space via  interlaminar approach at the L4-L5 level.  Loss of resistance and a  small amount of contrast was injected confirming epidural location of  the  needle.  Subsequently, a mixture of 3 mL Celestone and 3 mL  Lidocaine 1% was injected.  The needle was removed.  Estimated blood  loss during the procedure was less than 5 mL. No specimens collected.  The patient tolerated the procedure well and there were no immediate  complications.        Fluoro time: 0.4 minutes  Images Obtained: 9  Medications: 3 mL 1% lidocaine, 1 ml of Isovue m200, 18 mg of  betamethasone, 3 mL 1% lidocaine.    Patient's pain levels (0-10 scale) are as follows:    PRE INJECTION     Low back                 10     Right leg                  0   Left leg                     10      POST INJECTION   Low back                 3    Right leg                  0    Left leg                     4         Impression    IMPRESSION:  Technically successful lumbar translaminar epidural  steroid injection at L4-5.  Long-term results are pending.     JOHN DAVILA PA-C         SYSTEM ID:  [id number]   US Lower Extremity Venous Duplex Left    Narrative    VENOUS ULTRASOUND LEFT LOWER EXTREMITY  5/12/2022 9:52 AM     HISTORY: Left calf pain, rule out DVT.    COMPARISON: None.    TECHNIQUE:  Color Doppler and spectral waveform analysis performed  throughout the deep veins of the left lower extremity.    FINDINGS: The left common femoral, proximal greater saphenous,  visualized femoral, and popliteal veins demonstrate normal blood flow,  compression, and augmentation. Posterior tibial and peroneal veins are  compressible. Contralateral right common femoral vein is patent.  Difficult to visualize the distal femoral vein given body habitus.      Impression    IMPRESSION: Negative for deep venous thrombosis throughout the  visualized veins of the left lower extremity.     CEE YBARRA MD         SYSTEM ID:  [id number]           Most Recent Lab Results In EPIC (For Non-EPIC Providers):    Most Recent 3 CBC's:  Recent Labs   Lab Test 05/10/22  0625 05/09/22  2108   WBC 8.1 10.0   HGB 14.4 13.8   MCV 87 88     219      Most Recent 3 BMP's:  Recent Labs   Lab Test 05/09/22  2108      POTASSIUM 3.4   CHLORIDE 109   CO2 25   BUN 17   CR 0.71   ANIONGAP 7   MAYTE 8.8   GLC 87     Most Recent 3 Troponin's:No lab results found.    Invalid input(s): TROP, TROPONINIES  Most Recent 3 INR's:No lab results found.  Most Recent 2 LFT's:No lab results found.  Most Recent Cholesterol Panel:No lab results found.  Most Recent 6 Bacteria Isolates From Any Culture (See EPIC Reports for Culture Details):No lab results found.  Most Recent TSH, T4 and HgbA1c:No lab results found.

## 2022-05-13 NOTE — PROGRESS NOTES
MD Notification    Notified Person: MD    Notified Person Name: Dr. Moses    Notification Date/Time: 5/13/22 1230    Notification Interaction: paged MD    Purpose of Notification: pt hoping to discharge

## 2022-05-13 NOTE — PROGRESS NOTES
Observation goals  PRIOR TO DISCHARGE        Comments:   -diagnostic tests and consults completed and resulted: met   -vital signs normal or at patient baseline: partially met  -adequate pain control on oral analgesics: met  -returns to baseline functional status: partially met   Nurse to notify provider when observation goals have been met and patient is ready for discharge.

## 2022-05-17 DIAGNOSIS — M54.16 LUMBAR BACK PAIN WITH RADICULOPATHY AFFECTING LEFT LOWER EXTREMITY: Primary | ICD-10-CM

## 2022-05-17 NOTE — TELEPHONE ENCOUNTER
SPINE PATIENTS - NEW PROTOCOL PREVISIT    RECORDS RECEIVED FROM: Internal   REASON FOR VISIT: back pain   Date of Appt: 05/19/2022   NOTES (FOR ALL VISITS) STATUS DETAILS   OFFICE NOTE from referring provider N/A    OFFICE NOTE from other specialist Care Everywhere 05/06/2022 Dr Araiza [facility]     DISCHARGE SUMMARY from hospital Internal 05/09/2022 [facility] MH Dr Moses    DISCHARGE REPORT from ER N/A    EMG REPORT N/A    MEDICATION LIST N/A    IMAGING  (FOR ALL VISITS)     MRI (HEAD, NECK, SPINE) Internal 05/10/2022 lumbar spine   XRAY (SPINE) *NEUROSURGERY* Received 05/06/2022 lumbar spine   CT (HEAD, NECK, SPINE) N/A         Images in PACS

## 2022-05-19 ENCOUNTER — OFFICE VISIT (OUTPATIENT)
Dept: NEUROSURGERY | Facility: CLINIC | Age: 42
End: 2022-05-19
Attending: NURSE PRACTITIONER
Payer: COMMERCIAL

## 2022-05-19 ENCOUNTER — ANCILLARY PROCEDURE (OUTPATIENT)
Dept: GENERAL RADIOLOGY | Facility: CLINIC | Age: 42
End: 2022-05-19
Attending: STUDENT IN AN ORGANIZED HEALTH CARE EDUCATION/TRAINING PROGRAM
Payer: COMMERCIAL

## 2022-05-19 ENCOUNTER — PRE VISIT (OUTPATIENT)
Dept: NEUROSURGERY | Facility: CLINIC | Age: 42
End: 2022-05-19
Payer: COMMERCIAL

## 2022-05-19 VITALS — HEIGHT: 64 IN | BODY MASS INDEX: 48.65 KG/M2 | WEIGHT: 285 LBS | HEART RATE: 100 BPM

## 2022-05-19 DIAGNOSIS — Z01.818 PRE-OP TESTING: ICD-10-CM

## 2022-05-19 DIAGNOSIS — M54.16 LUMBAR BACK PAIN WITH RADICULOPATHY AFFECTING LEFT LOWER EXTREMITY: ICD-10-CM

## 2022-05-19 PROCEDURE — 72120 X-RAY BEND ONLY L-S SPINE: CPT | Performed by: RADIOLOGY

## 2022-05-19 PROCEDURE — 99214 OFFICE O/P EST MOD 30 MIN: CPT | Performed by: STUDENT IN AN ORGANIZED HEALTH CARE EDUCATION/TRAINING PROGRAM

## 2022-05-19 PROCEDURE — 72082 X-RAY EXAM ENTIRE SPI 2/3 VW: CPT | Performed by: STUDENT IN AN ORGANIZED HEALTH CARE EDUCATION/TRAINING PROGRAM

## 2022-05-19 RX ORDER — METHOCARBAMOL 500 MG/1
500 TABLET, FILM COATED ORAL 4 TIMES DAILY PRN
Qty: 60 TABLET | Refills: 3 | Status: ON HOLD | OUTPATIENT
Start: 2022-05-19 | End: 2022-06-10

## 2022-05-19 ASSESSMENT — PAIN SCALES - GENERAL: PAINLEVEL: EXTREME PAIN (8)

## 2022-05-19 NOTE — PATIENT INSTRUCTIONS
Patient Instructions    Surgery scheduled at [facility] L4-5 minimally invasive laminotomy and discectomy  with Dr. Escoto    Pre-Operative  Surgical risks: blood clots in the leg or lung, problems urinating, nerve damage, drainage from the incision, infection,stiffness  Pre-operative physical with primary care physician within 30 days of surgical date.   Likely same day procedure with discharge home day of surgery, may stay for 23 hour observation hospitalization for monitoring.     Shower procedure  Please shower with antimicrobial soap the night before surgery and morning of surgery. Please refer to showering instruction sheet in folder.  Eating/Drinking  Stop all solid foods 8 hours before surgery.  Keep drinking clear liquids until 4 hours before surgery  Clear liquids include water, clear juice, black coffee, or clear tea without milk, Gatorade, clear soda.   Medications  Hold Aspirin, NSAIDs (Advil/Ibuprofen, Indocin, Naproxen,Nuprin,Relafen/Nabumetone, Diclofenac,Meloxicam, Aleve, Celebrex) x 7 days prior to surgical date  You can take Tylenol (Acetaminophen) for pain, 1000 mg  Do not exceed 3,000 mg per day   Any other medications prescribed, please discuss with your primary care provider at your pre-operative physical   As part of preparation for your upcoming procedure you are required to have a test for the novel Coronavirus, COVID-19  Please call the drive-up testing number to schedule your appointment. The test needs to be completed within 4 days (96) hours of surgery.   Scheduling Number: [phone]   You may NOT receive the COVID-19 vaccine 72 hours before or after surgery.    Pain Management  You will have post-operative incisional pain which may require pain medications and muscle relaxants. You will receive medication upon discharge.  You may resume taking NSAIDs (ex. Ibuprofen, aleve, naproxen) immediately post-op  Do NOT drive while taking narcotic pain medication  Do NOT  drink alcohol while using any pain medication  You can utilize ice as needed (no longer than 20 minutes at one time)    Incision Care  No submerging incision in water such as pools, hot tubs, baths for at least 8 weeks or until incision is healed  It is okay to shower, just pat the incision dry   Remove dressing as instructed upon discharge  Watch for signs of infection  Redness, swelling, warmth, drainage, and fever of 101 degrees or higher  Notify clinic [phone].    Activity Restrictions  For the first 6-8 weeks, no lifting > 10 pounds, limited bending, twisting, or overhead reaching.  Take stairs in moderation   Ok to walk as tolerated, take short frequent walks. You may gradually increase the distance as tolerated.   Avoid bed rest and prolonged sitting for longer than 30 minutes (change positions frequently while awake)  No contact sports until after follow up visit  No high impact activities such as; running/jogging, snowmobile or 4 everett riding or any other recreational vehicles  Please call the clinic if you develop any of the following symptoms:  Swelling and/or warmth in one or both legs  Pain or tenderness in your leg, ankle, foot, or arm   Red or discolored skin     Post-Op Follow Up Appointments  2 week incision check with RN  6 week post op follow up visit with Dr. Escoto and X-ray prior (for SDS ACDFs and Arthroplasties)  3 months post op with Dr. Escoto and X-ray prior (for SDS ACDFs and Arthroplasties)  Please call to schedule follow up appointment at [phone]    Resources  If you are currently employed, you will need to be off work for 2-4 weeks for post op recovery and healing.  Please fax any FMLA/short term disability paperwork to [phone]  You may call our clinic when you'd like to return to work and we can provide a work letter  To allow staff adequate time to complete paperwork, please send as soon as possible       [facility]  Phone:  [phone]  Fax: [phone]

## 2022-05-19 NOTE — LETTER
"    5/19/2022         RE: Haylee Couch  [address]  [place] [address]        Dear Colleague,    Thank you for referring your patient, Haylee Couch, to the [facility] [place]. Please see a copy of my visit note below.    Haylee Couch's goals for this visit include:   Chief Complaint   Patient presents with     New Patient     hospital f/u request to be seen by Dr. Escoto. NEW PT/needs scoli XR and   flex/ext xrays completed// left side calf is numb       She requests these members of her care team be copied on today's visit information: yes    PCP:  [facility] [place]      Pulse 100   Ht 1.626 m (5' 4\")   Wt 129.3 kg (285 lb)   BMI 48.92 kg/m      Do you need any medication refills at today's visit? Yes  CR [place], [facility] ([facility])          HPI:  42-year-old female with low back pain radiating to the left leg starting in January.  She has had episodes of pain in the past but not as severe as the current episode.  The recent pain started after shoveling.  Initially the pain was only in the back but approximately 2 weeks ago the pain started down the left leg to the lateral aspect of the lower leg.  She denies any right leg pain.  She does note subjective weakness due to the pain.  She has been started on gabapentin and underwent an epidural injection approximately 1 week ago.  She has noticed some improvement in the pain however not significant.  She has done a couple of episodes of physical therapy in the hospital to this point.  Current Outpatient Medications   Medication     gabapentin (NEURONTIN) 300 MG capsule     HYDROmorphone (DILAUDID) 4 MG tablet     levonorgestrel-ethinyl estradiol (NORDETTE) 0.15-30 MG-MCG tablet     methocarbamol (ROBAXIN) 500 MG tablet     multivitamin w/minerals (THERA-VIT-M) tablet     naproxen (NAPROSYN) 500 MG tablet     pantoprazole (PROTONIX) 40 MG EC tablet     senna-docusate (SENOKOT-S/PERICOLACE) 8.6-50 MG " "tablet     traMADol (ULTRAM) 50 MG tablet     No current facility-administered medications for this visit.      Physical Exam:  Vital signs:        Pulse: 100           Height: 162.6 cm (5' 4\") Weight: 129.3 kg (285 lb)  Estimated body mass index is 48.92 kg/m  as calculated from the following:    Height as of this encounter: 1.626 m (5' 4\").    Weight as of this encounter: 129.3 kg (285 lb).   She has full strength in her bilateral upper and lower extremities except for left dorsiflexion which is 4+ out of 5.  Sensation is intact to light touch throughout.  Patella and ankle reflexes are 1+ bilaterally.  Results Reviewed:  I personally reviewed the images of an MRI of the lumbar spine showing an L4-5 disc herniation paracentral to the left likely compressing the traversing L5 nerve root at this level.  I also reviewed flexion-extension films of the lumbar spine showing no evidence of dynamic instability.  Assessment:  42-year-old female with an L4-5 disc herniation causing an L5 radiculopathy on the left.  Plan:  We discussed conservative and surgical management options going forward.  I will start her on Robaxin in addition to her current pain medications.  While the pain during this episode has not been going on for very long she has had chronic episodes of pain in the imaging does correlate with her pain symptoms.  We discussed surgical option via an L4-5 laminotomy and discectomy.  Given the weakness we will schedule her for surgery in the near future however if her symptoms get better she can cancel the procedure.  We discussed the risks and the benefits of the procedure.  I instructed her to call if there are any worsening symptoms prior to the surgery.    Mayo Escoto MD      Again, thank you for allowing me to participate in the care of your patient.        Sincerely,        Mayo Escoto MD    "

## 2022-05-19 NOTE — NURSING NOTE
Reviewed pre- and post-operative instructions as outlined in the After Visit Summary/Patient Instructions with patient.   Surgery folder was given to patient    Patient Education Topic: Pre-op Teaching      Learner(s): Patient and Family     Knowledge Level: Advanced     Readiness to Learn: Ready    Method:  Verbal explanation and Written material     Outcome: Able to verbalize instructions    Barriers to Learning: No barrier    Covid Testing: patient educated they will need to have a test for the novel Coronavirus, COVID-19.The test needs to be completed within 4 days (96) hours of surgery. Order Placed.    Scheduling Number: [phone]    NDI/ZARA: Confirmation of completion within last 6 months    Patient had the opportunity for questions to be answered. Advised Patient and Family  to call clinic with any questions/concerns. Gave patient antibacterial soap for pre-surgery skin preparation.       Noa Springer, RNCC  Neurology/Neurosurgery/PM&R

## 2022-05-19 NOTE — PROGRESS NOTES
"HPI:  42-year-old female with low back pain radiating to the left leg starting in January.  She has had episodes of pain in the past but not as severe as the current episode.  The recent pain started after shoveling.  Initially the pain was only in the back but approximately 2 weeks ago the pain started down the left leg to the lateral aspect of the lower leg.  She denies any right leg pain.  She does note subjective weakness due to the pain.  She has been started on gabapentin and underwent an epidural injection approximately 1 week ago.  She has noticed some improvement in the pain however not significant.  She has done a couple of episodes of physical therapy in the hospital to this point.  Current Outpatient Medications   Medication     gabapentin (NEURONTIN) 300 MG capsule     HYDROmorphone (DILAUDID) 4 MG tablet     levonorgestrel-ethinyl estradiol (NORDETTE) 0.15-30 MG-MCG tablet     methocarbamol (ROBAXIN) 500 MG tablet     multivitamin w/minerals (THERA-VIT-M) tablet     naproxen (NAPROSYN) 500 MG tablet     pantoprazole (PROTONIX) 40 MG EC tablet     senna-docusate (SENOKOT-S/PERICOLACE) 8.6-50 MG tablet     traMADol (ULTRAM) 50 MG tablet     No current facility-administered medications for this visit.      Physical Exam:  Vital signs:        Pulse: 100           Height: 162.6 cm (5' 4\") Weight: 129.3 kg (285 lb)  Estimated body mass index is 48.92 kg/m  as calculated from the following:    Height as of this encounter: 1.626 m (5' 4\").    Weight as of this encounter: 129.3 kg (285 lb).   She has full strength in her bilateral upper and lower extremities except for left dorsiflexion which is 4+ out of 5.  Sensation is intact to light touch throughout.  Patella and ankle reflexes are 1+ bilaterally.  Results Reviewed:  I personally reviewed the images of an MRI of the lumbar spine showing an L4-5 disc herniation paracentral to the left likely compressing the traversing L5 nerve root at this level.  I also " reviewed flexion-extension films of the lumbar spine showing no evidence of dynamic instability.  Assessment:  42-year-old female with an L4-5 disc herniation causing an L5 radiculopathy on the left.  Plan:  We discussed conservative and surgical management options going forward.  I will start her on Robaxin in addition to her current pain medications.  While the pain during this episode has not been going on for very long she has had chronic episodes of pain in the imaging does correlate with her pain symptoms.  We discussed surgical option via an L4-5 laminotomy and discectomy.  Given the weakness we will schedule her for surgery in the near future however if her symptoms get better she can cancel the procedure.  We discussed the risks and the benefits of the procedure.  I instructed her to call if there are any worsening symptoms prior to the surgery.    Mayo Esctoo MD

## 2022-05-19 NOTE — PROGRESS NOTES
"Haylee Couch's goals for this visit include:   Chief Complaint   Patient presents with     [facility] f/u request to be seen by Dr. Escoto. NEW PT/needs scoli XR and   flex/ext xrays completed// left side calf is numb       She requests these members of her care team be copied on today's visit information: yes    PCP:  [facility] [place]      Pulse 100   Ht 1.626 m (5' 4\")   Wt 129.3 kg (285 lb)   BMI 48.92 kg/m      Do you need any medication refills at today's visit? Yes  CR Garrison, CMA ([facility])        "

## 2022-05-25 DIAGNOSIS — Z11.59 ENCOUNTER FOR SCREENING FOR OTHER VIRAL DISEASES: Primary | ICD-10-CM

## 2022-06-06 ENCOUNTER — MYC MEDICAL ADVICE (OUTPATIENT)
Dept: NEUROSURGERY | Facility: CLINIC | Age: 42
End: 2022-06-06
Payer: COMMERCIAL

## 2022-06-06 NOTE — TELEPHONE ENCOUNTER
Pt sent in [employer] message for surgery time and approval -    Pt is scheduled for 6/10/22 at 9:50 am at  OR.     Per referrals tab, surgery was approved on 6/3/22.    Pt updated of this.

## 2022-06-07 ENCOUNTER — LAB (OUTPATIENT)
Dept: LAB | Facility: CLINIC | Age: 42
End: 2022-06-07
Attending: STUDENT IN AN ORGANIZED HEALTH CARE EDUCATION/TRAINING PROGRAM
Payer: COMMERCIAL

## 2022-06-07 DIAGNOSIS — Z01.818 PRE-OP TESTING: ICD-10-CM

## 2022-06-07 PROCEDURE — U0005 INFEC AGEN DETEC AMPLI PROBE: HCPCS

## 2022-06-07 PROCEDURE — U0003 INFECTIOUS AGENT DETECTION BY NUCLEIC ACID (DNA OR RNA); SEVERE ACUTE RESPIRATORY SYNDROME CORONAVIRUS 2 (SARS-COV-2) (CORONAVIRUS DISEASE [COVID-19]), AMPLIFIED PROBE TECHNIQUE, MAKING USE OF HIGH THROUGHPUT TECHNOLOGIES AS DESCRIBED BY CMS-2020-01-R: HCPCS

## 2022-06-08 LAB — SARS-COV-2 RNA RESP QL NAA+PROBE: NEGATIVE

## 2022-06-09 ENCOUNTER — ANESTHESIA EVENT (OUTPATIENT)
Dept: SURGERY | Facility: CLINIC | Age: 42
End: 2022-06-09
Payer: COMMERCIAL

## 2022-06-10 ENCOUNTER — ANESTHESIA (OUTPATIENT)
Dept: SURGERY | Facility: CLINIC | Age: 42
End: 2022-06-10
Payer: COMMERCIAL

## 2022-06-10 ENCOUNTER — HOSPITAL ENCOUNTER (OUTPATIENT)
Facility: CLINIC | Age: 42
Discharge: HOME | End: 2022-06-10
Attending: STUDENT IN AN ORGANIZED HEALTH CARE EDUCATION/TRAINING PROGRAM | Admitting: STUDENT IN AN ORGANIZED HEALTH CARE EDUCATION/TRAINING PROGRAM
Payer: COMMERCIAL

## 2022-06-10 ENCOUNTER — APPOINTMENT (OUTPATIENT)
Dept: GENERAL RADIOLOGY | Facility: CLINIC | Age: 42
End: 2022-06-10
Attending: STUDENT IN AN ORGANIZED HEALTH CARE EDUCATION/TRAINING PROGRAM
Payer: COMMERCIAL

## 2022-06-10 VITALS
DIASTOLIC BLOOD PRESSURE: 92 MMHG | TEMPERATURE: 98.1 F | WEIGHT: 283.95 LBS | OXYGEN SATURATION: 94 % | SYSTOLIC BLOOD PRESSURE: 143 MMHG | RESPIRATION RATE: 16 BRPM | HEIGHT: 64 IN | HEART RATE: 77 BPM | BODY MASS INDEX: 48.48 KG/M2

## 2022-06-10 DIAGNOSIS — M54.16 LUMBAR BACK PAIN WITH RADICULOPATHY AFFECTING LEFT LOWER EXTREMITY: ICD-10-CM

## 2022-06-10 LAB
ABO/RH(D): NORMAL
ANTIBODY SCREEN: NEGATIVE
APTT PPP: 25 SECONDS (ref 22–38)
BASOPHILS # BLD AUTO: 0 10E3/UL (ref 0–0.2)
BASOPHILS NFR BLD AUTO: 1 %
EOSINOPHIL # BLD AUTO: 0.1 10E3/UL (ref 0–0.7)
EOSINOPHIL NFR BLD AUTO: 2 %
ERYTHROCYTE [DISTWIDTH] IN BLOOD BY AUTOMATED COUNT: 12.7 % (ref 10–15)
HCG SERPL QL: NEGATIVE
HCT VFR BLD AUTO: 45.8 % (ref 35–47)
HGB BLD-MCNC: 15.5 G/DL (ref 11.7–15.7)
IMM GRANULOCYTES # BLD: 0.1 10E3/UL
IMM GRANULOCYTES NFR BLD: 1 %
INR PPP: 1.03 (ref 0.85–1.15)
LYMPHOCYTES # BLD AUTO: 1.8 10E3/UL (ref 0.8–5.3)
LYMPHOCYTES NFR BLD AUTO: 24 %
MCH RBC QN AUTO: 29 PG (ref 26.5–33)
MCHC RBC AUTO-ENTMCNC: 33.8 G/DL (ref 31.5–36.5)
MCV RBC AUTO: 86 FL (ref 78–100)
MONOCYTES # BLD AUTO: 0.5 10E3/UL (ref 0–1.3)
MONOCYTES NFR BLD AUTO: 7 %
NEUTROPHILS # BLD AUTO: 5.1 10E3/UL (ref 1.6–8.3)
NEUTROPHILS NFR BLD AUTO: 65 %
NRBC # BLD AUTO: 0 10E3/UL
NRBC BLD AUTO-RTO: 0 /100
PLATELET # BLD AUTO: 262 10E3/UL (ref 150–450)
RBC # BLD AUTO: 5.35 10E6/UL (ref 3.8–5.2)
SPECIMEN EXPIRATION DATE: NORMAL
WBC # BLD AUTO: 7.6 10E3/UL (ref 4–11)

## 2022-06-10 PROCEDURE — 360N000084 HC SURGERY LEVEL 4 W/ FLUORO, PER MIN: Performed by: STUDENT IN AN ORGANIZED HEALTH CARE EDUCATION/TRAINING PROGRAM

## 2022-06-10 PROCEDURE — 250N000013 HC RX MED GY IP 250 OP 250 PS 637: Performed by: ANESTHESIOLOGY

## 2022-06-10 PROCEDURE — 710N000009 HC RECOVERY PHASE 1, LEVEL 1, PER MIN: Performed by: STUDENT IN AN ORGANIZED HEALTH CARE EDUCATION/TRAINING PROGRAM

## 2022-06-10 PROCEDURE — 86850 RBC ANTIBODY SCREEN: CPT | Performed by: PHYSICIAN ASSISTANT

## 2022-06-10 PROCEDURE — 250N000011 HC RX IP 250 OP 636: Performed by: ANESTHESIOLOGY

## 2022-06-10 PROCEDURE — 710N000012 HC RECOVERY PHASE 2, PER MINUTE: Performed by: STUDENT IN AN ORGANIZED HEALTH CARE EDUCATION/TRAINING PROGRAM

## 2022-06-10 PROCEDURE — 370N000017 HC ANESTHESIA TECHNICAL FEE, PER MIN: Performed by: STUDENT IN AN ORGANIZED HEALTH CARE EDUCATION/TRAINING PROGRAM

## 2022-06-10 PROCEDURE — 85730 THROMBOPLASTIN TIME PARTIAL: CPT | Performed by: PHYSICIAN ASSISTANT

## 2022-06-10 PROCEDURE — 250N000009 HC RX 250: Performed by: STUDENT IN AN ORGANIZED HEALTH CARE EDUCATION/TRAINING PROGRAM

## 2022-06-10 PROCEDURE — 258N000003 HC RX IP 258 OP 636: Performed by: NURSE ANESTHETIST, CERTIFIED REGISTERED

## 2022-06-10 PROCEDURE — 999N000141 HC STATISTIC PRE-PROCEDURE NURSING ASSESSMENT: Performed by: STUDENT IN AN ORGANIZED HEALTH CARE EDUCATION/TRAINING PROGRAM

## 2022-06-10 PROCEDURE — 36415 COLL VENOUS BLD VENIPUNCTURE: CPT | Performed by: ANESTHESIOLOGY

## 2022-06-10 PROCEDURE — 85610 PROTHROMBIN TIME: CPT | Performed by: PHYSICIAN ASSISTANT

## 2022-06-10 PROCEDURE — 250N000025 HC SEVOFLURANE, PER MIN: Performed by: STUDENT IN AN ORGANIZED HEALTH CARE EDUCATION/TRAINING PROGRAM

## 2022-06-10 PROCEDURE — 272N000002 HC OR SUPPLY OTHER OPNP: Performed by: STUDENT IN AN ORGANIZED HEALTH CARE EDUCATION/TRAINING PROGRAM

## 2022-06-10 PROCEDURE — 250N000011 HC RX IP 250 OP 636: Performed by: PHYSICIAN ASSISTANT

## 2022-06-10 PROCEDURE — 84703 CHORIONIC GONADOTROPIN ASSAY: CPT | Performed by: ANESTHESIOLOGY

## 2022-06-10 PROCEDURE — 272N000001 HC OR GENERAL SUPPLY STERILE: Performed by: STUDENT IN AN ORGANIZED HEALTH CARE EDUCATION/TRAINING PROGRAM

## 2022-06-10 PROCEDURE — 85018 HEMOGLOBIN: CPT | Performed by: PHYSICIAN ASSISTANT

## 2022-06-10 PROCEDURE — 999N000179 XR SURGERY CARM FLUORO LESS THAN 5 MIN W STILLS

## 2022-06-10 PROCEDURE — 250N000011 HC RX IP 250 OP 636: Performed by: STUDENT IN AN ORGANIZED HEALTH CARE EDUCATION/TRAINING PROGRAM

## 2022-06-10 PROCEDURE — 250N000011 HC RX IP 250 OP 636: Performed by: NURSE ANESTHETIST, CERTIFIED REGISTERED

## 2022-06-10 PROCEDURE — 250N000009 HC RX 250: Performed by: NURSE ANESTHETIST, CERTIFIED REGISTERED

## 2022-06-10 RX ORDER — ONDANSETRON 4 MG/1
4 TABLET, ORALLY DISINTEGRATING ORAL EVERY 30 MIN PRN
Status: DISCONTINUED | OUTPATIENT
Start: 2022-06-10 | End: 2022-06-10 | Stop reason: HOSPADM

## 2022-06-10 RX ORDER — HYDROCODONE BITARTRATE AND ACETAMINOPHEN 5; 325 MG/1; MG/1
1 TABLET ORAL EVERY 6 HOURS PRN
Qty: 40 TABLET | Refills: 0 | Status: SHIPPED | OUTPATIENT
Start: 2022-06-10 | End: 2022-06-10

## 2022-06-10 RX ORDER — HYDROMORPHONE HYDROCHLORIDE 1 MG/ML
INJECTION, SOLUTION INTRAMUSCULAR; INTRAVENOUS; SUBCUTANEOUS PRN
Status: DISCONTINUED | OUTPATIENT
Start: 2022-06-10 | End: 2022-06-10

## 2022-06-10 RX ORDER — SODIUM CHLORIDE, SODIUM LACTATE, POTASSIUM CHLORIDE, CALCIUM CHLORIDE 600; 310; 30; 20 MG/100ML; MG/100ML; MG/100ML; MG/100ML
INJECTION, SOLUTION INTRAVENOUS CONTINUOUS
Status: DISCONTINUED | OUTPATIENT
Start: 2022-06-10 | End: 2022-06-10 | Stop reason: HOSPADM

## 2022-06-10 RX ORDER — LEVONORGESTREL AND ETHINYL ESTRADIOL 0.15-0.03
1 KIT ORAL DAILY
COMMUNITY

## 2022-06-10 RX ORDER — SODIUM CHLORIDE, SODIUM LACTATE, POTASSIUM CHLORIDE, CALCIUM CHLORIDE 600; 310; 30; 20 MG/100ML; MG/100ML; MG/100ML; MG/100ML
INJECTION, SOLUTION INTRAVENOUS CONTINUOUS PRN
Status: DISCONTINUED | OUTPATIENT
Start: 2022-06-10 | End: 2022-06-10

## 2022-06-10 RX ORDER — NEOSTIGMINE METHYLSULFATE 1 MG/ML
VIAL (ML) INJECTION PRN
Status: DISCONTINUED | OUTPATIENT
Start: 2022-06-10 | End: 2022-06-10

## 2022-06-10 RX ORDER — LIDOCAINE HYDROCHLORIDE 20 MG/ML
INJECTION, SOLUTION INFILTRATION; PERINEURAL PRN
Status: DISCONTINUED | OUTPATIENT
Start: 2022-06-10 | End: 2022-06-10

## 2022-06-10 RX ORDER — HYDROCODONE BITARTRATE AND ACETAMINOPHEN 10; 325 MG/1; MG/1
1 TABLET ORAL ONCE
Status: DISCONTINUED | OUTPATIENT
Start: 2022-06-10 | End: 2022-06-10 | Stop reason: HOSPADM

## 2022-06-10 RX ORDER — ACETAMINOPHEN 325 MG/1
650 TABLET ORAL
Status: DISCONTINUED | OUTPATIENT
Start: 2022-06-10 | End: 2022-06-10 | Stop reason: HOSPADM

## 2022-06-10 RX ORDER — LABETALOL HYDROCHLORIDE 5 MG/ML
10 INJECTION, SOLUTION INTRAVENOUS
Status: DISCONTINUED | OUTPATIENT
Start: 2022-06-10 | End: 2022-06-10 | Stop reason: HOSPADM

## 2022-06-10 RX ORDER — FENTANYL CITRATE 0.05 MG/ML
25 INJECTION, SOLUTION INTRAMUSCULAR; INTRAVENOUS
Status: DISCONTINUED | OUTPATIENT
Start: 2022-06-10 | End: 2022-06-10 | Stop reason: HOSPADM

## 2022-06-10 RX ORDER — ONDANSETRON 2 MG/ML
4 INJECTION INTRAMUSCULAR; INTRAVENOUS EVERY 30 MIN PRN
Status: DISCONTINUED | OUTPATIENT
Start: 2022-06-10 | End: 2022-06-10 | Stop reason: HOSPADM

## 2022-06-10 RX ORDER — MEPERIDINE HYDROCHLORIDE 25 MG/ML
12.5 INJECTION INTRAMUSCULAR; INTRAVENOUS; SUBCUTANEOUS
Status: DISCONTINUED | OUTPATIENT
Start: 2022-06-10 | End: 2022-06-10 | Stop reason: HOSPADM

## 2022-06-10 RX ORDER — METHOCARBAMOL 750 MG/1
750 TABLET, FILM COATED ORAL
Status: DISCONTINUED | OUTPATIENT
Start: 2022-06-10 | End: 2022-06-10 | Stop reason: HOSPADM

## 2022-06-10 RX ORDER — ONDANSETRON 4 MG/1
4 TABLET, ORALLY DISINTEGRATING ORAL
Status: DISCONTINUED | OUTPATIENT
Start: 2022-06-10 | End: 2022-06-10 | Stop reason: HOSPADM

## 2022-06-10 RX ORDER — FENTANYL CITRATE 50 UG/ML
INJECTION, SOLUTION INTRAMUSCULAR; INTRAVENOUS PRN
Status: DISCONTINUED | OUTPATIENT
Start: 2022-06-10 | End: 2022-06-10

## 2022-06-10 RX ORDER — PROPOFOL 10 MG/ML
INJECTION, EMULSION INTRAVENOUS PRN
Status: DISCONTINUED | OUTPATIENT
Start: 2022-06-10 | End: 2022-06-10

## 2022-06-10 RX ORDER — BUPIVACAINE HYDROCHLORIDE AND EPINEPHRINE 5; 5 MG/ML; UG/ML
INJECTION, SOLUTION PERINEURAL PRN
Status: DISCONTINUED | OUTPATIENT
Start: 2022-06-10 | End: 2022-06-10 | Stop reason: HOSPADM

## 2022-06-10 RX ORDER — DEXAMETHASONE SODIUM PHOSPHATE 4 MG/ML
INJECTION, SOLUTION INTRA-ARTICULAR; INTRALESIONAL; INTRAMUSCULAR; INTRAVENOUS; SOFT TISSUE PRN
Status: DISCONTINUED | OUTPATIENT
Start: 2022-06-10 | End: 2022-06-10

## 2022-06-10 RX ORDER — FENTANYL CITRATE 50 UG/ML
25 INJECTION, SOLUTION INTRAMUSCULAR; INTRAVENOUS EVERY 5 MIN PRN
Status: DISCONTINUED | OUTPATIENT
Start: 2022-06-10 | End: 2022-06-10 | Stop reason: HOSPADM

## 2022-06-10 RX ORDER — HYDROCODONE BITARTRATE AND ACETAMINOPHEN 5; 325 MG/1; MG/1
1 TABLET ORAL ONCE
Status: COMPLETED | OUTPATIENT
Start: 2022-06-10 | End: 2022-06-10

## 2022-06-10 RX ORDER — ONDANSETRON 2 MG/ML
INJECTION INTRAMUSCULAR; INTRAVENOUS PRN
Status: DISCONTINUED | OUTPATIENT
Start: 2022-06-10 | End: 2022-06-10

## 2022-06-10 RX ORDER — METHOCARBAMOL 500 MG/1
500 TABLET, FILM COATED ORAL 4 TIMES DAILY PRN
Qty: 40 TABLET | Refills: 0 | Status: SHIPPED | OUTPATIENT
Start: 2022-06-10 | End: 2022-06-22

## 2022-06-10 RX ORDER — AMOXICILLIN 250 MG
1 CAPSULE ORAL 2 TIMES DAILY PRN
Qty: 40 TABLET | Refills: 0 | Status: SHIPPED | OUTPATIENT
Start: 2022-06-10 | End: 2023-01-12

## 2022-06-10 RX ORDER — CEFAZOLIN SODIUM/WATER 3 G/30 ML
3 SYRINGE (ML) INTRAVENOUS
Status: COMPLETED | OUTPATIENT
Start: 2022-06-10 | End: 2022-06-10

## 2022-06-10 RX ORDER — OXYCODONE HYDROCHLORIDE 5 MG/1
5 TABLET ORAL EVERY 6 HOURS PRN
Qty: 40 TABLET | Refills: 0 | Status: SHIPPED | OUTPATIENT
Start: 2022-06-10 | End: 2022-06-10

## 2022-06-10 RX ORDER — OXYCODONE HYDROCHLORIDE 5 MG/1
5 TABLET ORAL EVERY 4 HOURS PRN
Status: DISCONTINUED | OUTPATIENT
Start: 2022-06-10 | End: 2022-06-10 | Stop reason: HOSPADM

## 2022-06-10 RX ORDER — LEVONORGESTREL AND ETHINYL ESTRADIOL 0.15-0.03
1 KIT ORAL DAILY
Status: ON HOLD | COMMUNITY
Start: 2021-11-07 | End: 2022-06-10

## 2022-06-10 RX ORDER — PROPOFOL 10 MG/ML
INJECTION, EMULSION INTRAVENOUS CONTINUOUS PRN
Status: DISCONTINUED | OUTPATIENT
Start: 2022-06-10 | End: 2022-06-10

## 2022-06-10 RX ORDER — HYDROMORPHONE HCL IN WATER/PF 6 MG/30 ML
0.2 PATIENT CONTROLLED ANALGESIA SYRINGE INTRAVENOUS EVERY 5 MIN PRN
Status: DISCONTINUED | OUTPATIENT
Start: 2022-06-10 | End: 2022-06-10 | Stop reason: HOSPADM

## 2022-06-10 RX ORDER — CEFAZOLIN SODIUM/WATER 3 G/30 ML
3 SYRINGE (ML) INTRAVENOUS SEE ADMIN INSTRUCTIONS
Status: DISCONTINUED | OUTPATIENT
Start: 2022-06-10 | End: 2022-06-10 | Stop reason: HOSPADM

## 2022-06-10 RX ORDER — HYDROMORPHONE HYDROCHLORIDE 2 MG/1
2 TABLET ORAL EVERY 6 HOURS PRN
Qty: 30 TABLET | Refills: 0 | Status: SHIPPED | OUTPATIENT
Start: 2022-06-10 | End: 2022-06-22

## 2022-06-10 RX ORDER — OXYCODONE HYDROCHLORIDE 5 MG/1
5 TABLET ORAL
Status: DISCONTINUED | OUTPATIENT
Start: 2022-06-10 | End: 2022-06-10 | Stop reason: HOSPADM

## 2022-06-10 RX ORDER — GLYCOPYRROLATE 0.2 MG/ML
INJECTION, SOLUTION INTRAMUSCULAR; INTRAVENOUS PRN
Status: DISCONTINUED | OUTPATIENT
Start: 2022-06-10 | End: 2022-06-10

## 2022-06-10 RX ADMIN — FENTANYL CITRATE 50 MCG: 50 INJECTION, SOLUTION INTRAMUSCULAR; INTRAVENOUS at 11:01

## 2022-06-10 RX ADMIN — ONDANSETRON 4 MG: 2 INJECTION INTRAMUSCULAR; INTRAVENOUS at 11:17

## 2022-06-10 RX ADMIN — FENTANYL CITRATE 50 MCG: 50 INJECTION, SOLUTION INTRAMUSCULAR; INTRAVENOUS at 10:00

## 2022-06-10 RX ADMIN — PROPOFOL 15 MCG/KG/MIN: 10 INJECTION, EMULSION INTRAVENOUS at 10:13

## 2022-06-10 RX ADMIN — LIDOCAINE HYDROCHLORIDE 100 MG: 20 INJECTION, SOLUTION INFILTRATION; PERINEURAL at 10:00

## 2022-06-10 RX ADMIN — FENTANYL CITRATE 25 MCG: 50 INJECTION, SOLUTION INTRAMUSCULAR; INTRAVENOUS at 12:30

## 2022-06-10 RX ADMIN — ROCURONIUM BROMIDE 10 MG: 50 INJECTION, SOLUTION INTRAVENOUS at 11:01

## 2022-06-10 RX ADMIN — SODIUM CHLORIDE, POTASSIUM CHLORIDE, SODIUM LACTATE AND CALCIUM CHLORIDE: 600; 310; 30; 20 INJECTION, SOLUTION INTRAVENOUS at 09:56

## 2022-06-10 RX ADMIN — GLYCOPYRROLATE 1 MG: 0.2 INJECTION, SOLUTION INTRAMUSCULAR; INTRAVENOUS at 11:24

## 2022-06-10 RX ADMIN — Medication 3 G: at 09:56

## 2022-06-10 RX ADMIN — ROCURONIUM BROMIDE 20 MG: 50 INJECTION, SOLUTION INTRAVENOUS at 10:23

## 2022-06-10 RX ADMIN — PROPOFOL 20 MG: 10 INJECTION, EMULSION INTRAVENOUS at 11:01

## 2022-06-10 RX ADMIN — DEXAMETHASONE SODIUM PHOSPHATE 4 MG: 4 INJECTION, SOLUTION INTRA-ARTICULAR; INTRALESIONAL; INTRAMUSCULAR; INTRAVENOUS; SOFT TISSUE at 10:15

## 2022-06-10 RX ADMIN — ROCURONIUM BROMIDE 10 MG: 50 INJECTION, SOLUTION INTRAVENOUS at 10:54

## 2022-06-10 RX ADMIN — HYDROCODONE BITARTRATE AND ACETAMINOPHEN 1 TABLET: 5; 325 TABLET ORAL at 12:46

## 2022-06-10 RX ADMIN — PROPOFOL 200 MG: 10 INJECTION, EMULSION INTRAVENOUS at 10:00

## 2022-06-10 RX ADMIN — FENTANYL CITRATE 25 MCG: 50 INJECTION, SOLUTION INTRAMUSCULAR; INTRAVENOUS at 12:25

## 2022-06-10 RX ADMIN — NEOSTIGMINE METHYLSULFATE 5 MG: 1 INJECTION, SOLUTION INTRAVENOUS at 11:24

## 2022-06-10 RX ADMIN — HYDROMORPHONE HYDROCHLORIDE 0.5 MG: 1 INJECTION, SOLUTION INTRAMUSCULAR; INTRAVENOUS; SUBCUTANEOUS at 10:20

## 2022-06-10 RX ADMIN — MIDAZOLAM 2 MG: 1 INJECTION INTRAMUSCULAR; INTRAVENOUS at 09:54

## 2022-06-10 RX ADMIN — ROCURONIUM BROMIDE 50 MG: 50 INJECTION, SOLUTION INTRAVENOUS at 10:00

## 2022-06-10 NOTE — ANESTHESIA POSTPROCEDURE EVALUATION
Patient: Haylee Couch    Procedure: Procedure(s):  Left Lumbar 4 to Lumbar 5 Laminotomy and Discectomy       Anesthesia Type:  General    Note:  Disposition: Outpatient   Postop Pain Control: Uneventful            Sign Out: Well controlled pain   PONV: No   Neuro/Psych: Uneventful            Sign Out: Acceptable/Baseline neuro status   Airway/Respiratory: Uneventful            Sign Out: Acceptable/Baseline resp. status   CV/Hemodynamics: Uneventful            Sign Out: Acceptable CV status; No obvious hypovolemia; No obvious fluid overload   Other NRE: NONE   DID A NON-ROUTINE EVENT OCCUR? No           Last vitals:  Vitals Value Taken Time   /80 06/10/22 1250   Temp 36.3  C (97.4  F) 06/10/22 1245   Pulse 86 06/10/22 1259   Resp 42 06/10/22 1259   SpO2 92 % 06/10/22 1258   Vitals shown include unvalidated device data.    Electronically Signed By: KEKE ZAMUDIO MD  Whitney 10, 2022  2:16 PM

## 2022-06-10 NOTE — ANESTHESIA PROCEDURE NOTES
Airway       Patient location during procedure: OR       Procedure Start/Stop Times: 6/10/2022 10:03 AM  Staff -        CRNA: Sandie Monroy APRN CRNA       Performed By: CRNA  Consent for Airway        Urgency: elective  Indications and Patient Condition       Indications for airway management: marvin-procedural       Induction type:intravenous       Mask difficulty assessment: 1 - vent by mask    Final Airway Details       Final airway type: endotracheal airway       Successful airway: ETT - single  Endotracheal Airway Details        ETT size (mm): 8.0       Cuffed: yes       Successful intubation technique: video laryngoscopy       VL Blade Size: Glidescope 3       Grade View of Cords: 1       Adjucts: stylet       Position: Right       Measured from: lips       Secured at (cm): 22       Bite block used: None    Post intubation assessment        Placement verified by: capnometry, equal breath sounds and chest rise        Number of attempts at approach: 1       Number of other approaches attempted: 0       Secured with: pink tape       Ease of procedure: easy       Dentition: Intact    Medication(s) Administered   Medication Administration Time: 6/10/2022 10:03 AM

## 2022-06-10 NOTE — OR NURSING
Pt stated that hydrocodone and oxycodone do not work for her, but dilaudid was helpful the last time she was hospitalized.  Dr. Escoto's PA notified to make the change in discharge prescription.  Discharge instructions completed and pt was able to urinate without difficulty.  Pt discharged to home with .

## 2022-06-10 NOTE — PROGRESS NOTES
Patient requested pain med to be switched from oxy to norco; switched  Patient now requesting switch from norco to dilaudid  Reviewed with nursing this is the last switch our team will make     Updated Dr. Tigist Chao PA-C  [facility]  [address] [address]    Tel [phone]  Pager [phone]

## 2022-06-10 NOTE — DISCHARGE INSTRUCTIONS
Same Day Surgery Discharge Instructions for  Sedation and General Anesthesia     It's not unusual to feel dizzy, light-headed or faint for up to 24 hours after surgery or while taking pain medication.  If you have these symptoms: sit for a few minutes before standing and have someone assist you when you get up to walk or use the bathroom.    You should rest and relax for the next 24 hours. We recommend you make arrangements to have an adult stay with you for at least 24 hours after your discharge.  Avoid hazardous and strenuous activity.    DO NOT DRIVE any vehicle or operate mechanical equipment for 24 hours following the end of your surgery.  Even though you may feel normal, your reactions may be affected by the medication you have received.    Do not drink alcoholic beverages for 24 hours following surgery.     Slowly progress to your regular diet as you feel able. It's not unusual to feel nauseated and/or vomit after receiving anesthesia.  If you develop these symptoms, drink clear liquids (apple juice, ginger ale, broth, 7-up, etc. ) until you feel better.  If your nausea and vomiting persists for 24 hours, please notify your surgeon.      All narcotic pain medications, along with inactivity and anesthesia, can cause constipation. Drinking plenty of liquids and increasing fiber intake will help.    For any questions of a medical nature, call your surgeon.    Do not make important decisions for 24 hours.    If you had general anesthesia, you may have a sore throat for a couple of days related to the breathing tube used during surgery.  You may use Cepacol lozenges to help with this discomfort.  If it worsens or if you develop a fever, contact your surgeon.     If you feel your pain is not well managed with the pain medications prescribed by your surgeon, please contact your surgeon's office to let them know so they can address your concerns.       CoVid 19 Information    We want to give you information regarding  Covid. Please consult your primary care provider with any questions you might have.     Patient who have symptoms (cough, fever, or shortness of breath), need to isolate for 7 days from when symptoms started OR 72 hours after fever resolves (without fever reducing medications) AND improvement of respiratory symptoms (whichever is longer).    Isolate yourself at home (in own room/own bathroom if possible)  Do Not allow any visitors  Do Not go to work or school  Do Not go to [employer],  centers, shopping, or other public places.  Do Not shake hands.  Avoid close and intimate contact with others (hugging, kissing).  Follow CDC recommendations for household cleaning of frequently touched services.     After the initial 7 days, continue to isolate yourself from household members as much as possible. To continue decrease the risk of community spread and exposure, you and any members of your household should limit activities in public for 14 days after starting home isolation.     You can reference the following CDC link for helpful home isolation/care tips:  https://www.cdc.gov/coronavirus/2019-ncov/downloads/10Things.pdf    Protect Others:  Cover Your Mouth and Nose with a mask, disposable tissue or wash cloth to avoid spreading germs to others.  Wash your hands and face frequently with soap and water    Call Your Primary Doctor If: Breathing difficulty develops or you become worse.    For more information about COVID19 and options for caring for yourself at home, please visit the CDC website at https://www.cdc.gov/coronavirus/2019-ncov/about/steps-when-sick.html  For more options for care at [facility], please visit our website at [email or web address]      Reasons to contact your surgeon:    Signs of possible infection: Check your incision daily for redness, swelling, warmth, red streaks or foul drainage.   Elevated temperature.  Pain not controlled with pain medication and/or  rest.   Uncontrolled nausea or vomiting.  Any questions or concerns.         Liquid Adhesive (Dermabond or Exofin)  General Care:  Keep the wound clean and dry however, shower or bathe as instructed by your surgeon.  Do not use soaps, lotions, or ointments on the wound area. Do not scrub the wound. After bathing, pat the wound dry with a soft towel.  Do not scratch, rub, or pick at the film. Do not place tape directly over the  film.  Do not apply liquids (such as peroxide), ointments, or creams to the wound while the strips or film are in place.  Most  wounds heal without problems. However, an infection sometimes occurs despite proper treatment. Therefore, watch for the signs of infection listed below.  FOLLOW UP as directed by the doctor or our staff. The  film will fall off naturally in 5 to 10 days.  CONTACT YOU SURGEON if any of the following occur:  Signs of infection:  Fever of 100.4 F (38 C) or higher, or as directed by your healthcare provider  Increasing pain in the wound  Increasing redness or swelling  Pus coming from the wound  Wound bleeds more than a small amount or bleeding doesn t stop  Wound edges come apart

## 2022-06-10 NOTE — ANESTHESIA PREPROCEDURE EVALUATION
Anesthesia Pre-Procedure Evaluation    Patient: Haylee Couch   MRN: [id number] : 1980        Procedure : Procedure(s):  Left Lumbar 4 to Lumbar 5 Laminotomy and Discectomy          Past Medical History:   Diagnosis Date     DDD (degenerative disc disease), lumbar      Lumbar back pain with radiculopathy affecting lower extremity      Obese      PCOS (polycystic ovarian syndrome)       Past Surgical History:   Procedure Laterality Date     CHOLECYSTECTOMY       DILATION AND CURETTAGE, OPERATIVE HYSTEROSCOPY WITH MORCELLATOR, COMBINED N/A 2017    Procedure: COMBINED DILATION AND CURETTAGE, OPERATIVE HYSTEROSCOPY WITH MORCELLATOR;  DILATION AND CURETTAGE, HYSTEROSCOPY, Polypectomy WITH MORCELLATOR;  Surgeon: Galileo Rm MD;  Location: [facility] OR     GYN SURGERY      c/s x2      No Known Allergies   Social History     Tobacco Use     Smoking status: Never Smoker     Smokeless tobacco: Not on file   Substance Use Topics     Alcohol use: Yes     Comment: occas      Wt Readings from Last 1 Encounters:   22 129.3 kg (285 lb)        Anesthesia Evaluation            ROS/MED HX  ENT/Pulmonary:    (-) sleep apnea   Neurologic:     (+) peripheral neuropathy,     Cardiovascular:       METS/Exercise Tolerance:     Hematologic:       Musculoskeletal:       GI/Hepatic:    (-) GERD   Renal/Genitourinary:       Endo:     (+) Obesity,     Psychiatric/Substance Use:       Infectious Disease:       Malignancy:       Other: Comment: PCOS;           Physical Exam    Airway        Mallampati: III   TM distance: > 3 FB   Neck ROM: full   Mouth opening: > 3 cm    Respiratory Devices and Support         Dental  no notable dental history         Cardiovascular   cardiovascular exam normal          Pulmonary   pulmonary exam normal                OUTSIDE LABS:  CBC:   Lab Results   Component Value Date    WBC 8.1 05/10/2022    WBC 10.0 2022    HGB 14.4 05/10/2022    HGB 13.8 2022    HCT 42.7 05/10/2022     HCT 41.9 05/09/2022     05/10/2022     05/09/2022     BMP:   Lab Results   Component Value Date     05/09/2022     (H) 06/03/2006    POTASSIUM 3.4 05/09/2022    POTASSIUM 3.5 06/03/2006    CHLORIDE 109 05/09/2022    CHLORIDE 106 06/03/2006    CO2 25 05/09/2022    CO2 27 06/03/2006    BUN 17 05/09/2022    BUN 18 06/03/2006    CR 0.71 05/09/2022    CR 1.00 06/03/2006    GLC 87 05/09/2022     06/03/2006     COAGS: No results found for: PTT, INR, FIBR  POC:   Lab Results   Component Value Date    HCGS Negative 04/25/2017     HEPATIC:   Lab Results   Component Value Date    ALBUMIN 3.8 06/03/2006    PROTTOTAL 7.1 06/03/2006    ALT 26 06/03/2006    AST 38 06/03/2006    ALKPHOS 111 06/03/2006    BILITOTAL 0.3 06/03/2006     OTHER:   Lab Results   Component Value Date    MAYTE 8.8 05/09/2022    LIPASE 96 06/03/2006       Anesthesia Plan    ASA Status:  2      Anesthesia Type: General.     - Airway: ETT   Induction: Intravenous.   Maintenance: Balanced.   Techniques and Equipment:     - Airway: Video-Laryngoscope         Consents    Anesthesia Plan(s) and associated risks, benefits, and realistic alternatives discussed. Questions answered and patient/representative(s) expressed understanding.    - Discussed:     - Discussed with:  Patient         Postoperative Care    Pain management: IV analgesics.   PONV prophylaxis: Ondansetron (or other 5HT-3), Dexamethasone or Solumedrol     Comments:                KEKE ZAMUDIO MD

## 2022-06-10 NOTE — ANESTHESIA CARE TRANSFER NOTE
Patient: Haylee Couch    Procedure: Procedure(s):  Left Lumbar 4 to Lumbar 5 Laminotomy and Discectomy       Diagnosis: Lumbar back pain with radiculopathy affecting left lower extremity [M54.16]  Diagnosis Additional Information: No value filed.    Anesthesia Type:   General     Note:    Oropharynx: oropharynx clear of all foreign objects  Level of Consciousness: drowsy  Oxygen Supplementation: face mask  Level of Supplemental Oxygen (L/min / FiO2): 8  Independent Airway: airway patency satisfactory and stable  Dentition: dentition unchanged  Vital Signs Stable: post-procedure vital signs reviewed and stable  Report to RN Given: handoff report given  Patient transferred to: PACU  Comments: Anesthesia Care Transfer Note    Patient: Haylee Couch    Transferred to: PACU with supplemental O2    Patient vital signs: stable    Airway: none    Monitors on, VSS, breathing spontaneously, report to RN      Sandie Cheek CRNA   6/10/2022 11:41 AM  Handoff Report: Identifed the Patient, Identified the Reponsible Provider, Reviewed the pertinent medical history, Discussed the surgical course, Reviewed Intra-OP anesthesia mangement and issues during anesthesia, Set expectations for post-procedure period and Allowed opportunity for questions and acknowledgement of understanding      Vitals:  Vitals Value Taken Time   /101 06/10/22 1137   Temp     Pulse 100 06/10/22 1139   Resp 20 06/10/22 1139   SpO2 97 % 06/10/22 1139   Vitals shown include unvalidated device data.    Electronically Signed By: AIMEE Sanches CRNA  Whitney 10, 2022  11:40 AM

## 2022-06-10 NOTE — BRIEF OP NOTE
[facility]    Brief Operative Note    Pre-operative diagnosis: Lumbar back pain with radiculopathy affecting left lower extremity [M54.16]  Post-operative diagnosis Same as pre-operative diagnosis    Procedure: Procedure(s):  Left Lumbar 4 to Lumbar 5 Laminotomy and Discectomy  Surgeon: Surgeon(s) and Role:     * Mayo Escoto MD - Primary  Anesthesia: General   Estimated Blood Loss: 15cc    Drains: None  Specimens: * No specimens in log *  Findings:   None.  Complications: None.  Implants: * No implants in log *    Mayo Escoto MD

## 2022-06-10 NOTE — OP NOTE
Location: Main or  Attending Surgeon: Mayo Escoto MD  Preprocedure diagnosis: Lumbar radiculopathy  Post procedure diagnosis: Same  Procedure:  1.  Minimally invasive left L4-5 laminotomy and discectomy  2.  Use of intraoperative fluoroscopy  To 3 use of intraoperative microscope  Indications: The patient is a 42-year-old female with left leg pain refractory to conservative management.  On imaging she has a disc herniation at L4-5 compressing the traversing L5 nerve root.  Procedure details:  The patient was brought to the main operating room, intubated placed under general anesthesia.  She is placed in the operative table in the prone position with all pressure points padded.  She is cleaned and prepped in sterile fashion.  Using a spinal needle the L4-5 level was localized with fluoroscopy.  We then injected local anesthetic and made an incision using a 10 blade and the left side of the spinous process of L4.  We then placed sequential dilators down to the left-sided lamina at L4-5 and verified her location once again using fluoroscopy.  We then brought in the microscope and remove the overlying soft tissue using monopolar cautery.  We then performed a left inferior laminotomy at L4 using high-speed drill and then widen the slightly using punch rongeurs.  The ligamentum flavum was opened using punch rongeurs and we identified the thecal sac.  Working inferiorly we identified the disc herniation and this was incised using a down-biting curette.  We then retrieved the disc fragments using micropituitary's and further investigation revealed no further evidence of disc fragments and no further compression.  The area was irrigated with normal saline.  Bone wax was applied to any bleeding bone edges and Floseal was placed in the wound and no further bleeding was identified at this time.  The tubes were then removed and then the fascial layer was closing 0 Vicryl sutures followed by 2-0 Vicryl sutures for the  subcutaneous layer and 3-0 Vicryl sutures for the dermal layer followed by 4-0 Monocryl suture and skin glue for the skin.  All counts were correct at the end the procedure.  The patient tolerated the procedure well.  She was extubated and transferred the PACU in stable condition.  I was present for the entire procedure.    Mayo Escoto MD

## 2022-06-11 ENCOUNTER — HEALTH MAINTENANCE LETTER (OUTPATIENT)
Age: 42
End: 2022-06-11

## 2022-06-13 ENCOUNTER — TELEPHONE (OUTPATIENT)
Dept: NEUROSURGERY | Facility: CLINIC | Age: 42
End: 2022-06-13
Payer: COMMERCIAL

## 2022-06-13 NOTE — TELEPHONE ENCOUNTER
Post-Op Call    DOS: 6/10  Surgery: Left Lumbar 4 to Lumbar 5 Laminotomy and Discectomy  Surgeon: Dr. Escoto    Attempted to reach out to patient, no answer. Left voice message for patient to call clinic back with any questions or concerns.

## 2022-06-14 ENCOUNTER — MYC MEDICAL ADVICE (OUTPATIENT)
Dept: NEUROSURGERY | Facility: CLINIC | Age: 42
End: 2022-06-14
Payer: COMMERCIAL

## 2022-06-14 DIAGNOSIS — M54.16 LUMBAR BACK PAIN WITH RADICULOPATHY AFFECTING LEFT LOWER EXTREMITY: ICD-10-CM

## 2022-06-14 DIAGNOSIS — Z98.890 S/P LUMBAR DISCECTOMY: Primary | ICD-10-CM

## 2022-06-14 RX ORDER — METHYLPREDNISOLONE 4 MG
TABLET, DOSE PACK ORAL
Qty: 21 TABLET | Refills: 0 | Status: SHIPPED | OUTPATIENT
Start: 2022-06-14 | End: 2022-07-21

## 2022-06-14 NOTE — TELEPHONE ENCOUNTER
Patient sent a [employer] message today stating she has new tingling in her left leg and continued pain in her left leg. Eva Chao PA-C reviewed message and reached out to Dr. Escoto who approved a MDP. MDP ordered and patient updated via [employer].     Patient also mentioned she was taking Dialudid and Norco. Per chart review patient requested 2 narcotic medication changes while in PACU. Eva Chao PA-C discontinued the Norco and then prescribed Dilaudid instead. The Norco medication seemed to have been sent to the pharmacy before the discontinue notification went though. This was confirmed and verified on a  that both Dilaudid and Norco were filled and sold to patient. Educated patient in [employer] message that she needs to discontinue the norco and can remain on dilaudid for pain control. Also reviewed red flag symptoms and advised patient to contact us if pain persists after taking the MDP.

## 2022-06-22 ENCOUNTER — VIRTUAL VISIT (OUTPATIENT)
Dept: NEUROSURGERY | Facility: CLINIC | Age: 42
End: 2022-06-22
Payer: COMMERCIAL

## 2022-06-22 DIAGNOSIS — M54.16 LUMBAR BACK PAIN WITH RADICULOPATHY AFFECTING LEFT LOWER EXTREMITY: ICD-10-CM

## 2022-06-22 DIAGNOSIS — Z98.890 STATUS POST SPINAL SURGERY: Primary | ICD-10-CM

## 2022-06-22 PROCEDURE — 99207 PR NO CHARGE NURSE ONLY: CPT

## 2022-06-22 RX ORDER — HYDROMORPHONE HYDROCHLORIDE 2 MG/1
2 TABLET ORAL EVERY 6 HOURS PRN
Qty: 30 TABLET | Refills: 0 | Status: SHIPPED | OUTPATIENT
Start: 2022-06-22 | End: 2022-07-21

## 2022-06-22 RX ORDER — METHOCARBAMOL 500 MG/1
500 TABLET, FILM COATED ORAL 4 TIMES DAILY PRN
Qty: 40 TABLET | Refills: 0 | Status: SHIPPED | OUTPATIENT
Start: 2022-06-22 | End: 2022-11-04

## 2022-06-22 NOTE — LETTER
[facility]  Neurosurgery Clinic  [address] [place] [address]  [id number]        June 22, 2022   To Whom it May Concern,      Haylee Couch  is being seen at our clinic for post-operative follow up care. He/she is able to return to work after July 8th, 2022 with the restrictions of:    -Light duty work only  -No heavy lifting greater than 10 pounds   -No twisting or bending    Haylee Couch will be re-evaluated at the next follow up visit. Please call our clinic with questions or concerns: [phone]      Sincerely,  Mayo Escoto MD  (Electronically Signed, June 22, 2022, 1132)

## 2022-06-22 NOTE — PROGRESS NOTES
"Patient presents for 2 week incision nurse visit check.     DOS: 6/10/22  Surgery: Left Lumbar 4 to Lumbar 5 Laminotomy and Discectomy  Surgeon: Dr. Escoto    Patient states her pain is 1/10 when sitting 1/10, moving is okay for 10-15 min and then increases pain to 6-7/10. Reports BL Numbness to LLE/calf lateral aspect. Increase when standing and feels off balance , \"leg feels shorter\", no weakness noted. Reviewed red flags with patient, encouraged ED visit if any develop.     Patient taking dilaudid BID (11 am and 10pm typically). Patient plans to taper dilaudid down to x1 at HS. Taking robaxin, gabapentin, tylenol, ibuprofen.     Reviewed that she is not able to drive while taking narcotics during the day.     Encouraged icing for at least 5-7 times throughout the day for 20-30 minutes at a time, avoiding heat to the incision area. Discussed maximum dose of Acetaminophen in 24 hours, along with taking NSAIDs as needed.     Patient is walking occasionally  without difficulty. Activity restrictions reinforced at this time as outlined the After Visit Summary. Patient inquires about a long car ride to [place] for a wedding. Patient encouraged to get out of care frequently to move around and prevent stiffness. Patient educated to dorsi/plantar flex to prevent DVT while in car.    Patient's appetite is normal  Bowel/bladder problems? No  Taking stool softeners? Yes, every few days PRN  Discussed reasons for constipation post-operatively and encouraged stool softeners while taking narcotic pain medication.     Patient denies signs of infection at incision site. Patient sent in [employer] message of incision, photo attached to end of note. No infection s/s noted, pt has leftover adhesive on incision. patient instructed to let us know if it does not work its way off and we can apply adhesive remover to incision. She may work it off carefully. Aftercare instructions discussed with patient.     Refills given at this appointment? " Yes > see other encounter  Return to work discussed at this appointment? Yes, patient requesting work letter excusing her. Letter  per AVS recommendations.  Sent via [employer]    All of patient's questions addressed today. She was instructed to call with any additional questions/concerns.     Instructions for Patient    Keep your incision clean and dry at all times.     No bathing, swimming, or submerging in water until incision is well healed.      No lifting greater than 10-15 pounds. No bending, twisting, or overhead reaching.    Return in 4 weeks for follow up appointment     Weaning from narcotic pain medications    When it is time, start weaning by extending the time between doses.     For example, if you're taking 2 tabs every 4 hours, spread it out to 2 tabs over 4.5, 5, 6 hours.     At that point you can certainly cut down to 1 tab, then wean to an as needed basis until completely done with them.    For refills, please call our clinic. A nurse will call you back to obtain a pain assessment. Please call 3-4 business days before you run out so we can ensure there is a provider available at the location you prefer for .    Call the clinic or go to Emergency Room if you develop any new pain, drainage, swelling, or fever. Go to the Emergency Room if sudden onset of severe headache, weakness, confusion, change in level of consciousness, pain, or loss of movement.    Post-operative appointments    6 week post op 7/21/22, 3 months post op 9/1/22    Please call clinic with any further questions or concerns    GEORGETTE Manjarrez  [facility]  [address] [address]  T:  [phone]  F:  [phone]

## 2022-06-22 NOTE — TELEPHONE ENCOUNTER
Patient requesting refills at RN visit. Assessment in other encounter. Routed to provider for review.

## 2022-06-22 NOTE — PATIENT INSTRUCTIONS
Instructions for Patient  Keep your incision clean and dry at all times.   No bathing, swimming, or submerging in water until incision is well healed.    No lifting greater than 10-15 pounds. No bending, twisting, or overhead reaching.  Return in 4 weeks for follow up appointment   Weaning from narcotic pain medications  When it is time, start weaning by extending the time between doses.   For example, if you're taking 2 tabs every 4 hours, spread it out to 2 tabs over 4.5, 5, 6 hours.   At that point you can certainly cut down to 1 tab, then wean to an as needed basis until completely done with them.  For refills, please call our clinic. A nurse will call you back to obtain a pain assessment. Please call 3-4 business days before you run out so we can ensure there is a provider available at the location you prefer for .  Call the clinic or go to Emergency Room if you develop any new pain, drainage, swelling, or fever. Go to the Emergency Room if sudden onset of severe headache, weakness, confusion, change in level of consciousness, pain, or loss of movement.  Post-operative appointments  6 week post op 7/21/22, 3 months post op 9/1/22  Please call clinic with any further questions or concerns    GEORGETTE Manjarrez  [facility]  [address] [address]  T:  [phone]  F:  [phone]

## 2022-07-21 ENCOUNTER — OFFICE VISIT (OUTPATIENT)
Dept: NEUROSURGERY | Facility: CLINIC | Age: 42
End: 2022-07-21
Payer: COMMERCIAL

## 2022-07-21 VITALS
WEIGHT: 286 LBS | SYSTOLIC BLOOD PRESSURE: 138 MMHG | DIASTOLIC BLOOD PRESSURE: 86 MMHG | BODY MASS INDEX: 48.83 KG/M2 | HEART RATE: 84 BPM | HEIGHT: 64 IN

## 2022-07-21 DIAGNOSIS — M54.16 LUMBAR BACK PAIN WITH RADICULOPATHY AFFECTING LEFT LOWER EXTREMITY: Primary | ICD-10-CM

## 2022-07-21 PROCEDURE — 99024 POSTOP FOLLOW-UP VISIT: CPT | Performed by: STUDENT IN AN ORGANIZED HEALTH CARE EDUCATION/TRAINING PROGRAM

## 2022-07-21 ASSESSMENT — PAIN SCALES - GENERAL: PAINLEVEL: NO PAIN (1)

## 2022-07-21 NOTE — LETTER
"    7/21/2022         RE: Haylee Couch  [address]  [place] [address]        Dear Colleague,    Thank you for referring your patient, Haylee Couch, to the [facility] [place]. Please see a copy of my visit note below.    HPI:  42-year-old female status post L4-5 laminotomy and discectomy for left leg pain.  Postoperatively she has had complete resolution of her leg pain.  She intermittently has low back pain with increased activity although this is mild and short limited.  She is occasionally taking Robaxin and gabapentin for intermittent pain but does not need to take this often.  Overall she is doing well.  Current Outpatient Medications   Medication     Acetaminophen (TYLENOL PO)     levonorgestrel-ethinyl estradiol (NORDETTE) 0.15-30 MG-MCG tablet     methocarbamol (ROBAXIN) 500 MG tablet     multivitamin w/minerals (THERA-VIT-M) tablet     naproxen (NAPROSYN) 500 MG tablet     senna-docusate (SENOKOT-S/PERICOLACE) 8.6-50 MG tablet     gabapentin (NEURONTIN) 300 MG capsule     No current facility-administered medications for this visit.      Physical Exam:  Vital signs:      BP: 138/86 Pulse: 84           Height: 162.6 cm (5' 4\") Weight: 129.7 kg (286 lb)  Estimated body mass index is 49.09 kg/m  as calculated from the following:    Height as of this encounter: 1.626 m (5' 4\").    Weight as of this encounter: 129.7 kg (286 lb).   She has full-strength her bilateral upper and lower extremities.  Sensations intact light touch throughout.  Incision is well-healed.  Results Reviewed:  No new imaging to review today.  Assessment:  42-year-old female status post L4-5 laminotomy and discectomy for left leg radiculopathy  Plan:  Will refer to physical therapy for rehab at this point.  She can increase activity with lifting restrictions increasing 5 to 10 pounds weekly.  Okay for travel as tolerated as well.  Okay for submerging in water going forward as well.  Follow-up with me " in 6 more weeks.    Mayo Escoto MD      Again, thank you for allowing me to participate in the care of your patient.        Sincerely,        Mayo Escoto MD

## 2022-07-21 NOTE — PROGRESS NOTES
"HPI:  42-year-old female status post L4-5 laminotomy and discectomy for left leg pain.  Postoperatively she has had complete resolution of her leg pain.  She intermittently has low back pain with increased activity although this is mild and short limited.  She is occasionally taking Robaxin and gabapentin for intermittent pain but does not need to take this often.  Overall she is doing well.  Current Outpatient Medications   Medication     Acetaminophen (TYLENOL PO)     levonorgestrel-ethinyl estradiol (NORDETTE) 0.15-30 MG-MCG tablet     methocarbamol (ROBAXIN) 500 MG tablet     multivitamin w/minerals (THERA-VIT-M) tablet     naproxen (NAPROSYN) 500 MG tablet     senna-docusate (SENOKOT-S/PERICOLACE) 8.6-50 MG tablet     gabapentin (NEURONTIN) 300 MG capsule     No current facility-administered medications for this visit.      Physical Exam:  Vital signs:      BP: 138/86 Pulse: 84           Height: 162.6 cm (5' 4\") Weight: 129.7 kg (286 lb)  Estimated body mass index is 49.09 kg/m  as calculated from the following:    Height as of this encounter: 1.626 m (5' 4\").    Weight as of this encounter: 129.7 kg (286 lb).   She has full-strength her bilateral upper and lower extremities.  Sensations intact light touch throughout.  Incision is well-healed.  Results Reviewed:  No new imaging to review today.  Assessment:  42-year-old female status post L4-5 laminotomy and discectomy for left leg radiculopathy  Plan:  Will refer to physical therapy for rehab at this point.  She can increase activity with lifting restrictions increasing 5 to 10 pounds weekly.  Okay for travel as tolerated as well.  Okay for submerging in water going forward as well.  Follow-up with me in 6 more weeks.    Mayo Escoto MD  "

## 2022-09-01 ENCOUNTER — OFFICE VISIT (OUTPATIENT)
Dept: NEUROSURGERY | Facility: CLINIC | Age: 42
End: 2022-09-01
Payer: COMMERCIAL

## 2022-09-01 VITALS
HEIGHT: 64 IN | WEIGHT: 286 LBS | DIASTOLIC BLOOD PRESSURE: 80 MMHG | BODY MASS INDEX: 48.83 KG/M2 | SYSTOLIC BLOOD PRESSURE: 122 MMHG

## 2022-09-01 DIAGNOSIS — E66.01 MORBID OBESITY (H): ICD-10-CM

## 2022-09-01 DIAGNOSIS — M54.16 LUMBAR BACK PAIN WITH RADICULOPATHY AFFECTING LEFT LOWER EXTREMITY: Primary | ICD-10-CM

## 2022-09-01 PROCEDURE — 99024 POSTOP FOLLOW-UP VISIT: CPT | Performed by: STUDENT IN AN ORGANIZED HEALTH CARE EDUCATION/TRAINING PROGRAM

## 2022-09-01 ASSESSMENT — PAIN SCALES - GENERAL: PAINLEVEL: NO PAIN (0)

## 2022-09-01 NOTE — NURSING NOTE
"Haylee Couch's goals for this visit include:   Chief Complaint   Patient presents with     RECHECK     Lumbar 12 wk post op DOS 6/10/22  no XR needed - AY       She requests these members of her care team be copied on today's visit information: yes    PCP: Aparna Araiza    Referring Provider:  No referring provider defined for this encounter.    /80 (BP Location: Right arm, Patient Position: Sitting, Cuff Size: Adult Large)   Ht 1.626 m (5' 4\")   Wt 129.7 kg (286 lb)   BMI 49.09 kg/m      Do you need any medication refills at today's visit? No  CR Garrison, CMA ([facility])      "

## 2022-09-01 NOTE — LETTER
"    9/1/2022         RE: Haylee Couch  [address]  [place] [address]        Dear Colleague,    Thank you for referring your patient, Haylee Couch, to the [facility] [place]. Please see a copy of my visit note below.    HPI:  42-year-old female status post left L4-5 laminotomy and discectomy for a left leg radiculopathy.  Following surgery she has been doing very well.  She has minor back pain occasionally.  She does get pain that comes on with exercise but this goes away quickly.  She does not have any leg pain at this point.  Current Outpatient Medications   Medication     Acetaminophen (TYLENOL PO)     levonorgestrel-ethinyl estradiol (NORDETTE) 0.15-30 MG-MCG tablet     multivitamin w/minerals (THERA-VIT-M) tablet     naproxen (NAPROSYN) 500 MG tablet     gabapentin (NEURONTIN) 300 MG capsule     methocarbamol (ROBAXIN) 500 MG tablet     senna-docusate (SENOKOT-S/PERICOLACE) 8.6-50 MG tablet     No current facility-administered medications for this visit.      Physical Exam:  Vital signs:      BP: 122/80             Height: 162.6 cm (5' 4\") Weight: 129.7 kg (286 lb)  Estimated body mass index is 49.09 kg/m  as calculated from the following:    Height as of this encounter: 1.626 m (5' 4\").    Weight as of this encounter: 129.7 kg (286 lb).   She has full strength in her bilateral lower extremities.  Sensation is intact light touch throughout.  Incision is well-healed.  Results Reviewed:  No new imaging to review today  Assessment:  42-year-old female status post L4-5 laminotomy and discectomy for a left leg radiculopathy.  Plan:  Patient can follow-up as needed going forward.  She can advance activity as tolerated without formal restrictions as well.    Mayo Escoto MD      Again, thank you for allowing me to participate in the care of your patient.        Sincerely,        Mayo Escoto MD    "

## 2022-09-01 NOTE — PROGRESS NOTES
"HPI:  42-year-old female status post left L4-5 laminotomy and discectomy for a left leg radiculopathy.  Following surgery she has been doing very well.  She has minor back pain occasionally.  She does get pain that comes on with exercise but this goes away quickly.  She does not have any leg pain at this point.  Current Outpatient Medications   Medication     Acetaminophen (TYLENOL PO)     levonorgestrel-ethinyl estradiol (NORDETTE) 0.15-30 MG-MCG tablet     multivitamin w/minerals (THERA-VIT-M) tablet     naproxen (NAPROSYN) 500 MG tablet     gabapentin (NEURONTIN) 300 MG capsule     methocarbamol (ROBAXIN) 500 MG tablet     senna-docusate (SENOKOT-S/PERICOLACE) 8.6-50 MG tablet     No current facility-administered medications for this visit.      Physical Exam:  Vital signs:      BP: 122/80             Height: 162.6 cm (5' 4\") Weight: 129.7 kg (286 lb)  Estimated body mass index is 49.09 kg/m  as calculated from the following:    Height as of this encounter: 1.626 m (5' 4\").    Weight as of this encounter: 129.7 kg (286 lb).   She has full strength in her bilateral lower extremities.  Sensation is intact light touch throughout.  Incision is well-healed.  Results Reviewed:  No new imaging to review today  Assessment:  42-year-old female status post L4-5 laminotomy and discectomy for a left leg radiculopathy.  Plan:  Patient can follow-up as needed going forward.  She can advance activity as tolerated without formal restrictions as well.    Mayo Escoto MD  "

## 2022-09-11 PROBLEM — E66.01 MORBID OBESITY (H): Status: ACTIVE | Noted: 2022-09-11

## 2022-10-16 ENCOUNTER — HEALTH MAINTENANCE LETTER (OUTPATIENT)
Age: 42
End: 2022-10-16

## 2022-11-04 ENCOUNTER — MYC MEDICAL ADVICE (OUTPATIENT)
Dept: NEUROSURGERY | Facility: CLINIC | Age: 42
End: 2022-11-04

## 2022-11-04 DIAGNOSIS — M54.16 LUMBAR BACK PAIN WITH RADICULOPATHY AFFECTING LEFT LOWER EXTREMITY: ICD-10-CM

## 2022-11-04 DIAGNOSIS — Z98.890 STATUS POST SPINAL SURGERY: ICD-10-CM

## 2022-11-04 RX ORDER — METHYLPREDNISOLONE 4 MG
TABLET, DOSE PACK ORAL
Qty: 21 TABLET | Refills: 0 | Status: SHIPPED | OUTPATIENT
Start: 2022-11-04 | End: 2023-01-12

## 2022-11-04 RX ORDER — METHOCARBAMOL 500 MG/1
500 TABLET, FILM COATED ORAL 4 TIMES DAILY PRN
Qty: 40 TABLET | Refills: 0 | Status: SHIPPED | OUTPATIENT
Start: 2022-11-04

## 2022-11-04 NOTE — TELEPHONE ENCOUNTER
Eva Chao PA-C reviewed and ok to prescribe Medrol dose pack and Robaxin. She would like patient to CTM and contact our clinic should pain/symptoms persist.     Attempted to reach out to patient, no answer. Left detailed voice message with above info. mychart sent as well. MDP and Robaxin sent to [facility].

## 2022-11-04 NOTE — TELEPHONE ENCOUNTER
Attempted to reach out to patient, no answer. Left voice message.     Tried to call patient for additional assessment questions not answered in her reply back in mychart.     Will respond back in my chart with the additional questions.

## 2022-11-04 NOTE — TELEPHONE ENCOUNTER
Patient returned call. She is s/p left L4-5 laminotomy and discectomy on 6/10/22 with Dr. Escoto.     She said the only thing she can think of that could possibly contribute to her pain would be some light vacuuming/mopping or lifting a 30 lb carry on size luggage bag.     She denies any t/n or weakness to LLE or new pain. Describes pain as constant and sharp. Pain greatly reduced when she takes her gabapentin, ibuprofen and ice. Her pain is from her low back that radiates to left buttock down to mid calf of left leg.     Reports her pain is worse in the morning and at night when the medications wear off. Pain is manageable during the day. She does reports some tight muscles in her back and down her left leg. Patient does not have any remaining tabs of muscle relaxer robaxin leftover from surgery.     Please see [facility] messages for additional info as well. Will review with care team if MDP and possible refill of robaxin would be appropriate.    If approved for any medications patient would like those sent to [facility] pharmacy off [address] in [place].    Message routed to care team for review and recommendations.

## 2022-11-13 ENCOUNTER — MYC REFILL (OUTPATIENT)
Dept: NEUROSURGERY | Facility: CLINIC | Age: 42
End: 2022-11-13

## 2022-11-13 DIAGNOSIS — Z98.890 STATUS POST SPINAL SURGERY: ICD-10-CM

## 2022-11-13 DIAGNOSIS — M54.16 LUMBAR BACK PAIN WITH RADICULOPATHY AFFECTING LEFT LOWER EXTREMITY: ICD-10-CM

## 2022-11-13 RX ORDER — METHYLPREDNISOLONE 4 MG
TABLET, DOSE PACK ORAL
Qty: 21 TABLET | Refills: 0 | Status: CANCELLED | OUTPATIENT
Start: 2022-11-13

## 2022-11-14 NOTE — TELEPHONE ENCOUNTER
Pt looking for MDP refill -    Pt updated that this is not a medication that we prescribe back to back.    Pt reports 2/10 pain with flexeril, advil, and gabapentin. No N/T. Pain is sharp and in her low back through left buttock and LLE ankle.     Pt instructed to add tylenol up to 3,000 mg/day and to follow-up with us on if she has s/s persist or worsen.

## 2023-01-04 ENCOUNTER — TELEPHONE (OUTPATIENT)
Dept: NEUROSURGERY | Facility: CLINIC | Age: 43
End: 2023-01-04

## 2023-01-04 DIAGNOSIS — M54.16 LUMBAR BACK PAIN WITH RADICULOPATHY AFFECTING LEFT LOWER EXTREMITY: Primary | ICD-10-CM

## 2023-01-04 NOTE — TELEPHONE ENCOUNTER
[facility] Call Center    Phone Message    May a detailed message be left on voicemail: yes     Reason for Call: Other:     Pt was previously seen and had surgery by Dr. Escoto. Pt recently had a new spinal injury and was referred to see Dr. Escoto again. Pt declined the next available appts as they were too far out and is requesting a call back to discuss how to proceed.    Action Taken: Message routed to:  Adult Clinics: Neurology p [id number]    Travel Screening: Not Applicable

## 2023-01-05 NOTE — TELEPHONE ENCOUNTER
Pt reports ongoing back pain despite medrol dosepak, heat/ice, and other OTC medications. States this may have been caused by light vacuuming/mopping or lifting a carry-on size luggage bag. Pain became worse the week before [place] so she went to [facility] urgent care and an MRI was completed the following week. She reports they found a herniated disc at L5-S1 location. Unclear if this has changed from previous lumbar MRI so clinic will request for report and images. She was told by [facility] to follow-up with Dr. Escoto again. Pt has a pending appt for a cortisone injection at [facility] on 1/16/23. Pt never received a new prescription for Robaxin but will inquire with her pharmacy if this is available for pick-up. Pt scheduled 1/12/23 to see Dr. Escoto again and writer will inquire if updated XR is needed.       Noa Springer, RNCC  Neurology/Neurosurgery/PM&R

## 2023-01-12 ENCOUNTER — OFFICE VISIT (OUTPATIENT)
Dept: NEUROSURGERY | Facility: CLINIC | Age: 43
End: 2023-01-12
Payer: COMMERCIAL

## 2023-01-12 ENCOUNTER — ANCILLARY PROCEDURE (OUTPATIENT)
Dept: GENERAL RADIOLOGY | Facility: CLINIC | Age: 43
End: 2023-01-12
Attending: STUDENT IN AN ORGANIZED HEALTH CARE EDUCATION/TRAINING PROGRAM
Payer: COMMERCIAL

## 2023-01-12 VITALS
BODY MASS INDEX: 50.02 KG/M2 | HEART RATE: 77 BPM | DIASTOLIC BLOOD PRESSURE: 76 MMHG | HEIGHT: 64 IN | SYSTOLIC BLOOD PRESSURE: 136 MMHG | WEIGHT: 293 LBS

## 2023-01-12 DIAGNOSIS — M54.16 LUMBAR BACK PAIN WITH RADICULOPATHY AFFECTING LEFT LOWER EXTREMITY: ICD-10-CM

## 2023-01-12 DIAGNOSIS — M53.3 SACROILIAC JOINT PAIN: Primary | ICD-10-CM

## 2023-01-12 PROCEDURE — 99215 OFFICE O/P EST HI 40 MIN: CPT | Performed by: STUDENT IN AN ORGANIZED HEALTH CARE EDUCATION/TRAINING PROGRAM

## 2023-01-12 PROCEDURE — 72120 X-RAY BEND ONLY L-S SPINE: CPT | Performed by: RADIOLOGY

## 2023-01-12 ASSESSMENT — PAIN SCALES - GENERAL: PAINLEVEL: MILD PAIN (3)

## 2023-01-12 NOTE — PROGRESS NOTES
"HPI:  42-year-old female status post left L4-5 laminotomy and discectomy for a left leg radiculopathy.  Postoperatively her leg pain significantly improved and she did well for approximately 4 months until new onset of low back pain this October.  She has tried over-the-counter medications including ibuprofen which does help as well as muscle relaxers which do help.  She occasionally gets pain radiating down the leg.  She does continue to have numbness after surgery but this is not changed since surgery.  Pain worsens with prolonged standing and prolonged sitting.  She has to lean off of her left side to improve the pain when sitting sometimes.    Current Outpatient Medications   Medication     Acetaminophen (TYLENOL PO)     levonorgestrel-ethinyl estradiol (NORDETTE) 0.15-30 MG-MCG tablet     methocarbamol (ROBAXIN) 500 MG tablet     multivitamin w/minerals (THERA-VIT-M) tablet     naproxen (NAPROSYN) 500 MG tablet     gabapentin (NEURONTIN) 300 MG capsule     No current facility-administered medications for this visit.      Physical Exam:  Vital signs:      BP: 136/76 Pulse: 77           Height: 162.6 cm (5' 4\") Weight: 134.7 kg (297 lb)  Estimated body mass index is 50.98 kg/m  as calculated from the following:    Height as of this encounter: 1.626 m (5' 4\").    Weight as of this encounter: 134.7 kg (297 lb).   She is full-strength in her right lower extremity and left lower extremity except for hip flexion which has 4-5 strength.  She has numbness in the L5 distribution on the left side.  Otherwise sensation is intact.  Gait is normal.  There is tenderness palpation over the left SI joint.  No tenderness over the midline.  No pain with loading the left femur but internal rotation of the left leg at the hip recreates pain.  Results Reviewed:  I personally viewed the images of an MRI of the lumbar spine showing resolution of the L4-5 disc herniation with no significant central canal or foraminal stenosis.  In " relation to L5-S1 there is a disc bulge but this appears slightly better compared to her prior MRI with no significant central canal or foraminal stenosis present.  I also reviewed flexion-extension images which do not show any evidence of dynamic instability and comparing the displacement of the spinal listhesis of L4-5 to an x-ray from May 2022 shows no significant differences.  Assessment:  42-year-old female with low back pain status post L4-5 discectomy.  Her pain most likely appears to be related to the SI joint or another pelvic cause of her low back pain.  It is lateralized to the left side.  I believe her hip flexion weakness is likely due to pain associated with raising the leg on the left side.  There is no significant central canal or foraminal stenosis present in the lumbar spine to indicate a disc herniation causing new problems.  Plan:  I will recommend a referral to my colleague Dr. Cerda for evaluation of pelvic joint disease and potential treatment options.  We discussed her upcoming epidural steroid injection she has planned for Monday.  This may not be helpful as they do not localize her pain symptoms to her spine.  I would likely wait until after she sees Dr. Cerda before considering another injection.  Continue with anti-inflammatory medications and muscle relaxers as needed for current pain.    I spent 45 minutes reviewing the patient's chart, interviewing examining the patient as well as discussing diagnosis and treatment options.    Mayo Escoto MD

## 2023-01-12 NOTE — LETTER
"    1/12/2023         RE: Haylee Couch  [address] [facility]  [place] [address]        Dear Colleague,    Thank you for referring your patient, Haylee Couch, to the [facility] [place]. Please see a copy of my visit note below.    HPI:  42-year-old female status post left L4-5 laminotomy and discectomy for a left leg radiculopathy.  Postoperatively her leg pain significantly improved and she did well for approximately 4 months until new onset of low back pain this October.  She has tried over-the-counter medications including ibuprofen which does help as well as muscle relaxers which do help.  She occasionally gets pain radiating down the leg.  She does continue to have numbness after surgery but this is not changed since surgery.  Pain worsens with prolonged standing and prolonged sitting.  She has to lean off of her left side to improve the pain when sitting sometimes.    Current Outpatient Medications   Medication     Acetaminophen (TYLENOL PO)     levonorgestrel-ethinyl estradiol (NORDETTE) 0.15-30 MG-MCG tablet     methocarbamol (ROBAXIN) 500 MG tablet     multivitamin w/minerals (THERA-VIT-M) tablet     naproxen (NAPROSYN) 500 MG tablet     gabapentin (NEURONTIN) 300 MG capsule     No current facility-administered medications for this visit.      Physical Exam:  Vital signs:      BP: 136/76 Pulse: 77           Height: 162.6 cm (5' 4\") Weight: 134.7 kg (297 lb)  Estimated body mass index is 50.98 kg/m  as calculated from the following:    Height as of this encounter: 1.626 m (5' 4\").    Weight as of this encounter: 134.7 kg (297 lb).   She is full-strength in her right lower extremity and left lower extremity except for hip flexion which has 4-5 strength.  She has numbness in the L5 distribution on the left side.  Otherwise sensation is intact.  Gait is normal.  There is tenderness palpation over the left SI joint.  No tenderness over the midline.  No pain with loading the left " femur but internal rotation of the left leg at the hip recreates pain.  Results Reviewed:  I personally viewed the images of an MRI of the lumbar spine showing resolution of the L4-5 disc herniation with no significant central canal or foraminal stenosis.  In relation to L5-S1 there is a disc bulge but this appears slightly better compared to her prior MRI with no significant central canal or foraminal stenosis present.  I also reviewed flexion-extension images which do not show any evidence of dynamic instability and comparing the displacement of the spinal listhesis of L4-5 to an x-ray from May 2022 shows no significant differences.  Assessment:  42-year-old female with low back pain status post L4-5 discectomy.  Her pain most likely appears to be related to the SI joint or another pelvic cause of her low back pain.  It is lateralized to the left side.  I believe her hip flexion weakness is likely due to pain associated with raising the leg on the left side.  There is no significant central canal or foraminal stenosis present in the lumbar spine to indicate a disc herniation causing new problems.  Plan:  I will recommend a referral to my colleague Dr. Cerda for evaluation of pelvic joint disease and potential treatment options.  We discussed her upcoming epidural steroid injection she has planned for Monday.  This may not be helpful as they do not localize her pain symptoms to her spine.  I would likely wait until after she sees Dr. Cerda before considering another injection.  Continue with anti-inflammatory medications and muscle relaxers as needed for current pain.    I spent 45 minutes reviewing the patient's chart, interviewing examining the patient as well as discussing diagnosis and treatment options.    Mayo Escoto MD        Again, thank you for allowing me to participate in the care of your patient.        Sincerely,        Mayo Escoto MD

## 2023-01-17 NOTE — TELEPHONE ENCOUNTER
SPINE PATIENTS - NEW PROTOCOL PREVISIT    RECORDS RECEIVED FROM: Internal   REASON FOR VISIT: Sacroiliac joint pain    Date of Appt: 02/21/2023   NOTES (FOR ALL VISITS) STATUS DETAILS   OFFICE NOTE from referring provider Internal 01/12/2023 Dr Escoto [facility]    OFFICE NOTE from other specialist Internal 12/21/2022 [facility]  06/07/2022 PT [facility]   05/06/2022 Dr Araiza [facility]   DISCHARGE SUMMARY from hospital Internal 05/09/2022 [facility]    DISCHARGE REPORT from ER N/A    EMG REPORT N/A    OPERATIVE REPORT Internal 06/10/2022 Left Lumbar 4 to Lumbar 5 Laminotomy and Discectomy   IMAGING  (FOR ALL VISITS)     MRI (HEAD, NECK, SPINE) Internal 05/10/2022 lumbar spine   XRAY (SPINE) *NEUROSURGERY* Internal 01/12/2023 lumbar spine  05/19/2022 complete spine, lumbar    CT (HEAD, NECK, SPINE) N/A

## 2023-02-21 ENCOUNTER — PRE VISIT (OUTPATIENT)
Dept: NEUROSURGERY | Facility: CLINIC | Age: 43
End: 2023-02-21

## 2023-04-06 NOTE — PROGRESS NOTES
Observation goals  PRIOR TO DISCHARGE       Comments:   -diagnostic tests and consults completed and resulted: met   -vital signs normal or at patient baseline: met  -adequate pain control on oral analgesics: partially met  -returns to baseline functional status: partially met   Nurse to notify provider when observation goals have been met and patient is ready for discharge.   initiation of breastfeeding/breast milk feeding

## 2023-06-17 ENCOUNTER — HEALTH MAINTENANCE LETTER (OUTPATIENT)
Age: 43
End: 2023-06-17

## 2024-03-23 ENCOUNTER — HEALTH MAINTENANCE LETTER (OUTPATIENT)
Age: 44
End: 2024-03-23

## 2024-08-10 ENCOUNTER — HEALTH MAINTENANCE LETTER (OUTPATIENT)
Age: 44
End: 2024-08-10

## 2025-07-19 NOTE — PROGRESS NOTES
Observation goals  PRIOR TO DISCHARGE       Comments:   -diagnostic tests and consults completed and resulted- Partially met    -vital signs normal or at patient baseline- Met    -adequate pain control on oral analgesics- Partially met    -returns to baseline functional status- Not met        18-Jul-2025 19:43

## 2025-08-16 ENCOUNTER — HEALTH MAINTENANCE LETTER (OUTPATIENT)
Age: 45
End: 2025-08-16

## (undated) DEVICE — SU VICRYL 0 CT-1 CR 8X18" J740D

## (undated) DEVICE — POSITIONER PT PRONESAFE HEAD REST W/DERMAPROX INSERT 40599

## (undated) DEVICE — DRAPE C-ARM 60X42" 1013

## (undated) DEVICE — GLOVE PROTEXIS W/NEU-THERA 7.0  2D73TE70

## (undated) DEVICE — CUSHION INSERT LG PRONE VIEW JACKSON TABLE

## (undated) DEVICE — SOL NACL 0.9% IRRIG 1000ML BOTTLE 2F7124

## (undated) DEVICE — PACK MINOR LITHOTOMY RIDGES

## (undated) DEVICE — SU VICRYL 2-0 CT-2 CR 8X18" J726D

## (undated) DEVICE — BASIN SET MINOR DISP

## (undated) DEVICE — MANIFOLD NEPTUNE 4 PORT 700-20

## (undated) DEVICE — LINEN HALF SHEET 5512

## (undated) DEVICE — SOL NACL 0.9% IRRIG 3000ML BAG 2B7477

## (undated) DEVICE — GLOVE PROTEXIS W/NEU-THERA 7.5  2D73TE75

## (undated) DEVICE — DRAPE POUCH IRR 1016

## (undated) DEVICE — CATH INTERMITTENT CLEAN-CATH FEMALE 14FR 6" VINYL LF 420614

## (undated) DEVICE — SU VICRYL 3-0 SH CR 8X18" J774

## (undated) DEVICE — BAG CLEAR TRASH 1.3M 39X33" P4040C

## (undated) DEVICE — SEAL SET MYOSURE ROD LENS SCOPE SINGLE USE 40-902

## (undated) DEVICE — ESU ELEC BLADE 6" COATED/INSULATED E1455-6

## (undated) DEVICE — ESU GROUND PAD ADULT W/CORD E7507

## (undated) DEVICE — LINEN FULL SHEET 5511

## (undated) DEVICE — SPONGE COTTONOID 1/2X1/2" 80-1400

## (undated) DEVICE — DRAPE STERI TOWEL LG 1010

## (undated) DEVICE — Device

## (undated) DEVICE — DRAPE MAYO STAND 23X54 8337

## (undated) DEVICE — LINEN TOWEL PACK X5 5464

## (undated) DEVICE — SPECIMEN BAG BEMIS HI FLOW SUCTION WHITE SOCK 533810

## (undated) DEVICE — GLOVE PROTEXIS BLUE W/NEU-THERA 7.5  2D73EB75

## (undated) DEVICE — DEVICE TISSUE REMOVAL MYOSURE 10-403/ SGL PKG 10-401

## (undated) DEVICE — SYR 30ML LL W/O NDL 302832

## (undated) DEVICE — SUCTION CANISTER BEMIS HI FLOW 006772-901

## (undated) DEVICE — DRAPE MICROSCOPE LEICA 54X150" AR8033650

## (undated) DEVICE — GLOVE PROTEXIS MICRO 6.5  2D73PM65

## (undated) DEVICE — PREP CHLORAPREP 26ML TINTED ORANGE  260815

## (undated) DEVICE — SU MONOCRYL 3-0 PS-2 27" Y427H

## (undated) DEVICE — PACK SPINE SM CUSTOM SNE15SSFSK

## (undated) DEVICE — TUBING SYS AQUILEX BLUE INFLOW AQL-110 YLW OUTFLOW AQL-111

## (undated) DEVICE — GLOVE PROTEXIS BLUE W/NEU-THERA 6.5  2D73EB65

## (undated) RX ORDER — FENTANYL CITRATE 50 UG/ML
INJECTION, SOLUTION INTRAMUSCULAR; INTRAVENOUS
Status: DISPENSED
Start: 2022-06-10

## (undated) RX ORDER — LIDOCAINE HYDROCHLORIDE 10 MG/ML
INJECTION, SOLUTION EPIDURAL; INFILTRATION; INTRACAUDAL; PERINEURAL
Status: DISPENSED
Start: 2022-05-10

## (undated) RX ORDER — PROPOFOL 10 MG/ML
INJECTION, EMULSION INTRAVENOUS
Status: DISPENSED
Start: 2017-04-25

## (undated) RX ORDER — BETAMETHASONE SODIUM PHOSPHATE AND BETAMETHASONE ACETATE 3; 3 MG/ML; MG/ML
INJECTION, SUSPENSION INTRA-ARTICULAR; INTRALESIONAL; INTRAMUSCULAR; SOFT TISSUE
Status: DISPENSED
Start: 2022-05-10

## (undated) RX ORDER — DEXAMETHASONE SODIUM PHOSPHATE 4 MG/ML
INJECTION, SOLUTION INTRA-ARTICULAR; INTRALESIONAL; INTRAMUSCULAR; INTRAVENOUS; SOFT TISSUE
Status: DISPENSED
Start: 2017-04-25

## (undated) RX ORDER — LIDOCAINE HYDROCHLORIDE 10 MG/ML
INJECTION, SOLUTION EPIDURAL; INFILTRATION; INTRACAUDAL; PERINEURAL
Status: DISPENSED
Start: 2017-04-25

## (undated) RX ORDER — CEFAZOLIN SODIUM/WATER 3 G/30 ML
SYRINGE (ML) INTRAVENOUS
Status: DISPENSED
Start: 2022-06-10

## (undated) RX ORDER — ONDANSETRON 2 MG/ML
INJECTION INTRAMUSCULAR; INTRAVENOUS
Status: DISPENSED
Start: 2017-04-25

## (undated) RX ORDER — FENTANYL CITRATE 0.05 MG/ML
INJECTION, SOLUTION INTRAMUSCULAR; INTRAVENOUS
Status: DISPENSED
Start: 2022-06-10

## (undated) RX ORDER — HYDROMORPHONE HYDROCHLORIDE 1 MG/ML
INJECTION, SOLUTION INTRAMUSCULAR; INTRAVENOUS; SUBCUTANEOUS
Status: DISPENSED
Start: 2022-06-10

## (undated) RX ORDER — FENTANYL CITRATE 50 UG/ML
INJECTION, SOLUTION INTRAMUSCULAR; INTRAVENOUS
Status: DISPENSED
Start: 2017-04-25

## (undated) RX ORDER — HYDROCODONE BITARTRATE AND ACETAMINOPHEN 5; 325 MG/1; MG/1
TABLET ORAL
Status: DISPENSED
Start: 2022-06-10

## (undated) RX ORDER — GLYCOPYRROLATE 0.2 MG/ML
INJECTION INTRAMUSCULAR; INTRAVENOUS
Status: DISPENSED
Start: 2017-04-25

## (undated) RX ORDER — BUPIVACAINE HYDROCHLORIDE 5 MG/ML
INJECTION, SOLUTION EPIDURAL; INTRACAUDAL
Status: DISPENSED
Start: 2022-05-10